# Patient Record
Sex: FEMALE | Race: BLACK OR AFRICAN AMERICAN | NOT HISPANIC OR LATINO | Employment: UNEMPLOYED | URBAN - METROPOLITAN AREA
[De-identification: names, ages, dates, MRNs, and addresses within clinical notes are randomized per-mention and may not be internally consistent; named-entity substitution may affect disease eponyms.]

---

## 2021-08-13 ENCOUNTER — TRANSFERRED RECORDS (OUTPATIENT)
Dept: HEALTH INFORMATION MANAGEMENT | Facility: CLINIC | Age: 56
End: 2021-08-13

## 2022-02-04 PROCEDURE — 99285 EMERGENCY DEPT VISIT HI MDM: CPT | Performed by: EMERGENCY MEDICINE

## 2022-02-04 PROCEDURE — C9803 HOPD COVID-19 SPEC COLLECT: HCPCS | Performed by: EMERGENCY MEDICINE

## 2022-02-04 PROCEDURE — 99285 EMERGENCY DEPT VISIT HI MDM: CPT | Mod: 25 | Performed by: EMERGENCY MEDICINE

## 2022-02-05 ENCOUNTER — APPOINTMENT (OUTPATIENT)
Dept: CT IMAGING | Facility: CLINIC | Age: 57
DRG: 375 | End: 2022-02-05
Attending: EMERGENCY MEDICINE
Payer: COMMERCIAL

## 2022-02-05 ENCOUNTER — APPOINTMENT (OUTPATIENT)
Dept: GENERAL RADIOLOGY | Facility: CLINIC | Age: 57
DRG: 375 | End: 2022-02-05
Attending: OBSTETRICS & GYNECOLOGY
Payer: COMMERCIAL

## 2022-02-05 ENCOUNTER — HOSPITAL ENCOUNTER (INPATIENT)
Facility: CLINIC | Age: 57
LOS: 6 days | Discharge: HOME | DRG: 375 | End: 2022-02-11
Attending: EMERGENCY MEDICINE | Admitting: OBSTETRICS & GYNECOLOGY
Payer: COMMERCIAL

## 2022-02-05 DIAGNOSIS — Z85.42 PERSONAL HISTORY OF MALIGNANT NEOPLASM OF UTERUS: ICD-10-CM

## 2022-02-05 DIAGNOSIS — Z90.710 S/P HYSTERECTOMY: ICD-10-CM

## 2022-02-05 DIAGNOSIS — R11.2 NAUSEA AND VOMITING, INTRACTABILITY OF VOMITING NOT SPECIFIED, UNSPECIFIED VOMITING TYPE: ICD-10-CM

## 2022-02-05 DIAGNOSIS — K56.609 SMALL BOWEL OBSTRUCTION (H): ICD-10-CM

## 2022-02-05 DIAGNOSIS — C78.6 METASTASIS TO PERITONEAL CAVITY (H): ICD-10-CM

## 2022-02-05 DIAGNOSIS — R10.84 ABDOMINAL PAIN, GENERALIZED: ICD-10-CM

## 2022-02-05 DIAGNOSIS — Z20.822 CONTACT WITH AND (SUSPECTED) EXPOSURE TO COVID-19: ICD-10-CM

## 2022-02-05 DIAGNOSIS — C55 MALIGNANT MIXED MULLERIAN TUMOR (MMMT) OF UTERUS (H): ICD-10-CM

## 2022-02-05 LAB
ALBUMIN SERPL-MCNC: 3.7 G/DL (ref 3.4–5)
ALBUMIN UR-MCNC: 30 MG/DL
ALP SERPL-CCNC: 68 U/L (ref 40–150)
ALT SERPL W P-5'-P-CCNC: 38 U/L (ref 0–50)
ANION GAP SERPL CALCULATED.3IONS-SCNC: 10 MMOL/L (ref 3–14)
ANION GAP SERPL CALCULATED.3IONS-SCNC: 9 MMOL/L (ref 3–14)
APPEARANCE UR: CLEAR
AST SERPL W P-5'-P-CCNC: 100 U/L (ref 0–45)
BASOPHILS # BLD AUTO: 0 10E3/UL (ref 0–0.2)
BASOPHILS NFR BLD AUTO: 0 %
BILIRUB SERPL-MCNC: 0.3 MG/DL (ref 0.2–1.3)
BILIRUB UR QL STRIP: NEGATIVE
BUN SERPL-MCNC: 10 MG/DL (ref 7–30)
BUN SERPL-MCNC: 15 MG/DL (ref 7–30)
CALCIUM SERPL-MCNC: 9 MG/DL (ref 8.5–10.1)
CALCIUM SERPL-MCNC: 9.4 MG/DL (ref 8.5–10.1)
CHLORIDE BLD-SCNC: 101 MMOL/L (ref 94–109)
CHLORIDE BLD-SCNC: 99 MMOL/L (ref 94–109)
CO2 SERPL-SCNC: 28 MMOL/L (ref 20–32)
CO2 SERPL-SCNC: 29 MMOL/L (ref 20–32)
COLOR UR AUTO: ABNORMAL
CREAT SERPL-MCNC: 0.53 MG/DL (ref 0.52–1.04)
CREAT SERPL-MCNC: 0.53 MG/DL (ref 0.52–1.04)
CREAT SERPL-MCNC: 0.73 MG/DL (ref 0.52–1.04)
EOSINOPHIL # BLD AUTO: 0 10E3/UL (ref 0–0.7)
EOSINOPHIL NFR BLD AUTO: 0 %
ERYTHROCYTE [DISTWIDTH] IN BLOOD BY AUTOMATED COUNT: 25.7 % (ref 10–15)
GFR SERPL CREATININE-BSD FRML MDRD: >90 ML/MIN/1.73M2
GLUCOSE BLD-MCNC: 113 MG/DL (ref 70–99)
GLUCOSE BLD-MCNC: 85 MG/DL (ref 70–99)
GLUCOSE UR STRIP-MCNC: NEGATIVE MG/DL
HCT VFR BLD AUTO: 30 % (ref 35–47)
HGB BLD-MCNC: 9.9 G/DL (ref 11.7–15.7)
HGB UR QL STRIP: NEGATIVE
HOLD SPECIMEN: NORMAL
IMM GRANULOCYTES # BLD: 0 10E3/UL
IMM GRANULOCYTES NFR BLD: 0 %
KETONES UR STRIP-MCNC: 20 MG/DL
LEUKOCYTE ESTERASE UR QL STRIP: NEGATIVE
LIPASE SERPL-CCNC: 50 U/L (ref 73–393)
LYMPHOCYTES # BLD AUTO: 0.4 10E3/UL (ref 0.8–5.3)
LYMPHOCYTES NFR BLD AUTO: 8 %
MCH RBC QN AUTO: 23.6 PG (ref 26.5–33)
MCHC RBC AUTO-ENTMCNC: 33 G/DL (ref 31.5–36.5)
MCV RBC AUTO: 72 FL (ref 78–100)
MONOCYTES # BLD AUTO: 0.3 10E3/UL (ref 0–1.3)
MONOCYTES NFR BLD AUTO: 6 %
NEUTROPHILS # BLD AUTO: 3.8 10E3/UL (ref 1.6–8.3)
NEUTROPHILS NFR BLD AUTO: 86 %
NITRATE UR QL: NEGATIVE
NRBC # BLD AUTO: 0 10E3/UL
NRBC BLD AUTO-RTO: 0 /100
PH UR STRIP: 6.5 [PH] (ref 5–7)
PHOSPHATE SERPL-MCNC: 4.4 MG/DL (ref 2.5–4.5)
PLATELET # BLD AUTO: 198 10E3/UL (ref 150–450)
POTASSIUM BLD-SCNC: 3.2 MMOL/L (ref 3.4–5.3)
POTASSIUM BLD-SCNC: 3.2 MMOL/L (ref 3.4–5.3)
POTASSIUM BLD-SCNC: 3.3 MMOL/L (ref 3.4–5.3)
POTASSIUM BLD-SCNC: 3.3 MMOL/L (ref 3.4–5.3)
PROT SERPL-MCNC: 8.5 G/DL (ref 6.8–8.8)
RBC # BLD AUTO: 4.19 10E6/UL (ref 3.8–5.2)
RBC URINE: 2 /HPF
SARS-COV-2 RNA RESP QL NAA+PROBE: NEGATIVE
SODIUM SERPL-SCNC: 137 MMOL/L (ref 133–144)
SODIUM SERPL-SCNC: 139 MMOL/L (ref 133–144)
SP GR UR STRIP: 1.02 (ref 1–1.03)
SQUAMOUS EPITHELIAL: 2 /HPF
TRANSITIONAL EPI: <1 /HPF
UROBILINOGEN UR STRIP-MCNC: NORMAL MG/DL
WBC # BLD AUTO: 4.4 10E3/UL (ref 4–11)
WBC URINE: 2 /HPF

## 2022-02-05 PROCEDURE — 999N000065 XR ABDOMEN PORT 1 VIEWS

## 2022-02-05 PROCEDURE — 74177 CT ABD & PELVIS W/CONTRAST: CPT

## 2022-02-05 PROCEDURE — 250N000013 HC RX MED GY IP 250 OP 250 PS 637: Performed by: OBSTETRICS & GYNECOLOGY

## 2022-02-05 PROCEDURE — 96365 THER/PROPH/DIAG IV INF INIT: CPT | Mod: 59 | Performed by: EMERGENCY MEDICINE

## 2022-02-05 PROCEDURE — 36415 COLL VENOUS BLD VENIPUNCTURE: CPT | Performed by: OBSTETRICS & GYNECOLOGY

## 2022-02-05 PROCEDURE — 83690 ASSAY OF LIPASE: CPT | Performed by: EMERGENCY MEDICINE

## 2022-02-05 PROCEDURE — 84100 ASSAY OF PHOSPHORUS: CPT | Performed by: STUDENT IN AN ORGANIZED HEALTH CARE EDUCATION/TRAINING PROGRAM

## 2022-02-05 PROCEDURE — 250N000011 HC RX IP 250 OP 636: Performed by: OBSTETRICS & GYNECOLOGY

## 2022-02-05 PROCEDURE — 74018 RADEX ABDOMEN 1 VIEW: CPT | Mod: 26 | Performed by: RADIOLOGY

## 2022-02-05 PROCEDURE — 250N000009 HC RX 250: Performed by: STUDENT IN AN ORGANIZED HEALTH CARE EDUCATION/TRAINING PROGRAM

## 2022-02-05 PROCEDURE — 80053 COMPREHEN METABOLIC PANEL: CPT | Performed by: EMERGENCY MEDICINE

## 2022-02-05 PROCEDURE — 250N000011 HC RX IP 250 OP 636: Performed by: EMERGENCY MEDICINE

## 2022-02-05 PROCEDURE — 258N000003 HC RX IP 258 OP 636: Performed by: STUDENT IN AN ORGANIZED HEALTH CARE EDUCATION/TRAINING PROGRAM

## 2022-02-05 PROCEDURE — 36415 COLL VENOUS BLD VENIPUNCTURE: CPT | Performed by: EMERGENCY MEDICINE

## 2022-02-05 PROCEDURE — 84132 ASSAY OF SERUM POTASSIUM: CPT | Performed by: OBSTETRICS & GYNECOLOGY

## 2022-02-05 PROCEDURE — 74177 CT ABD & PELVIS W/CONTRAST: CPT | Mod: 26 | Performed by: RADIOLOGY

## 2022-02-05 PROCEDURE — 250N000011 HC RX IP 250 OP 636: Performed by: STUDENT IN AN ORGANIZED HEALTH CARE EDUCATION/TRAINING PROGRAM

## 2022-02-05 PROCEDURE — U0005 INFEC AGEN DETEC AMPLI PROBE: HCPCS | Performed by: EMERGENCY MEDICINE

## 2022-02-05 PROCEDURE — 82565 ASSAY OF CREATININE: CPT | Performed by: STUDENT IN AN ORGANIZED HEALTH CARE EDUCATION/TRAINING PROGRAM

## 2022-02-05 PROCEDURE — 85025 COMPLETE CBC W/AUTO DIFF WBC: CPT | Performed by: EMERGENCY MEDICINE

## 2022-02-05 PROCEDURE — 250N000009 HC RX 250: Performed by: OBSTETRICS & GYNECOLOGY

## 2022-02-05 PROCEDURE — 81003 URINALYSIS AUTO W/O SCOPE: CPT | Performed by: STUDENT IN AN ORGANIZED HEALTH CARE EDUCATION/TRAINING PROGRAM

## 2022-02-05 PROCEDURE — 96375 TX/PRO/DX INJ NEW DRUG ADDON: CPT | Performed by: EMERGENCY MEDICINE

## 2022-02-05 PROCEDURE — 36415 COLL VENOUS BLD VENIPUNCTURE: CPT | Performed by: STUDENT IN AN ORGANIZED HEALTH CARE EDUCATION/TRAINING PROGRAM

## 2022-02-05 PROCEDURE — 96366 THER/PROPH/DIAG IV INF ADDON: CPT | Performed by: EMERGENCY MEDICINE

## 2022-02-05 PROCEDURE — 120N000002 HC R&B MED SURG/OB UMMC

## 2022-02-05 PROCEDURE — 250N000013 HC RX MED GY IP 250 OP 250 PS 637: Performed by: STUDENT IN AN ORGANIZED HEALTH CARE EDUCATION/TRAINING PROGRAM

## 2022-02-05 RX ORDER — NALOXONE HYDROCHLORIDE 0.4 MG/ML
0.2 INJECTION, SOLUTION INTRAMUSCULAR; INTRAVENOUS; SUBCUTANEOUS
Status: DISCONTINUED | OUTPATIENT
Start: 2022-02-05 | End: 2022-02-11 | Stop reason: HOSPADM

## 2022-02-05 RX ORDER — SODIUM CHLORIDE 9 MG/ML
INJECTION, SOLUTION INTRAVENOUS CONTINUOUS
Status: DISCONTINUED | OUTPATIENT
Start: 2022-02-05 | End: 2022-02-06

## 2022-02-05 RX ORDER — PROCHLORPERAZINE MALEATE 5 MG
5 TABLET ORAL EVERY 6 HOURS PRN
Status: DISCONTINUED | OUTPATIENT
Start: 2022-02-05 | End: 2022-02-11 | Stop reason: HOSPADM

## 2022-02-05 RX ORDER — IBUPROFEN 600 MG/1
600 TABLET, FILM COATED ORAL EVERY 6 HOURS PRN
COMMUNITY
Start: 2021-09-21

## 2022-02-05 RX ORDER — POTASSIUM CHLORIDE 1.5 G/1.58G
20 POWDER, FOR SOLUTION ORAL ONCE
Status: DISCONTINUED | OUTPATIENT
Start: 2022-02-05 | End: 2022-02-05

## 2022-02-05 RX ORDER — DIPHENHYDRAMINE HYDROCHLORIDE 50 MG/ML
50 INJECTION INTRAMUSCULAR; INTRAVENOUS
Status: DISCONTINUED | OUTPATIENT
Start: 2022-02-05 | End: 2022-02-11 | Stop reason: HOSPADM

## 2022-02-05 RX ORDER — LORAZEPAM 2 MG/ML
.5-1 INJECTION INTRAMUSCULAR EVERY 6 HOURS PRN
Status: DISCONTINUED | OUTPATIENT
Start: 2022-02-05 | End: 2022-02-11 | Stop reason: HOSPADM

## 2022-02-05 RX ORDER — ONDANSETRON 4 MG/1
4 TABLET, ORALLY DISINTEGRATING ORAL ONCE
Status: COMPLETED | OUTPATIENT
Start: 2022-02-05 | End: 2022-02-05

## 2022-02-05 RX ORDER — ONDANSETRON 8 MG/1
8 TABLET, FILM COATED ORAL 3 TIMES DAILY PRN
COMMUNITY
Start: 2021-09-27

## 2022-02-05 RX ORDER — ACETAMINOPHEN 325 MG/1
650 TABLET ORAL EVERY 6 HOURS PRN
COMMUNITY
Start: 2021-09-21

## 2022-02-05 RX ORDER — PROCHLORPERAZINE MALEATE 5 MG
5 TABLET ORAL EVERY 6 HOURS PRN
Status: DISCONTINUED | OUTPATIENT
Start: 2022-02-05 | End: 2022-02-05

## 2022-02-05 RX ORDER — POTASSIUM CHLORIDE 7.45 MG/ML
10 INJECTION INTRAVENOUS
Status: COMPLETED | OUTPATIENT
Start: 2022-02-05 | End: 2022-02-05

## 2022-02-05 RX ORDER — FOLIC ACID 1 MG/1
1000 TABLET ORAL DAILY
COMMUNITY
Start: 2021-12-03

## 2022-02-05 RX ORDER — AMLODIPINE BESYLATE 10 MG/1
10 TABLET ORAL ONCE
Status: COMPLETED | OUTPATIENT
Start: 2022-02-05 | End: 2022-02-05

## 2022-02-05 RX ORDER — AMLODIPINE BESYLATE 10 MG/1
10 TABLET ORAL DAILY
Status: DISCONTINUED | OUTPATIENT
Start: 2022-02-05 | End: 2022-02-05

## 2022-02-05 RX ORDER — AMOXICILLIN 250 MG
1 CAPSULE ORAL 2 TIMES DAILY PRN
COMMUNITY
Start: 2021-10-18

## 2022-02-05 RX ORDER — POTASSIUM CHLORIDE 1.5 G/1.58G
20 POWDER, FOR SOLUTION ORAL ONCE
Status: COMPLETED | OUTPATIENT
Start: 2022-02-05 | End: 2022-02-05

## 2022-02-05 RX ORDER — IOPAMIDOL 755 MG/ML
66 INJECTION, SOLUTION INTRAVASCULAR ONCE
Status: COMPLETED | OUTPATIENT
Start: 2022-02-05 | End: 2022-02-05

## 2022-02-05 RX ORDER — LORATADINE 10 MG/1
10 TABLET ORAL 2 TIMES DAILY PRN
COMMUNITY
Start: 2021-12-13

## 2022-02-05 RX ORDER — NALOXONE HYDROCHLORIDE 0.4 MG/ML
0.4 INJECTION, SOLUTION INTRAMUSCULAR; INTRAVENOUS; SUBCUTANEOUS
Status: DISCONTINUED | OUTPATIENT
Start: 2022-02-05 | End: 2022-02-11 | Stop reason: HOSPADM

## 2022-02-05 RX ORDER — LORAZEPAM 0.5 MG/1
0.5 TABLET ORAL EVERY 6 HOURS PRN
COMMUNITY
Start: 2021-09-27

## 2022-02-05 RX ORDER — ONDANSETRON 2 MG/ML
8 INJECTION INTRAMUSCULAR; INTRAVENOUS ONCE
Status: COMPLETED | OUTPATIENT
Start: 2022-02-05 | End: 2022-02-05

## 2022-02-05 RX ORDER — POTASSIUM CHLORIDE 7.45 MG/ML
10 INJECTION INTRAVENOUS
Status: COMPLETED | OUTPATIENT
Start: 2022-02-06 | End: 2022-02-06

## 2022-02-05 RX ORDER — AMLODIPINE BESYLATE 10 MG/1
10 TABLET ORAL DAILY
Status: DISCONTINUED | OUTPATIENT
Start: 2022-02-06 | End: 2022-02-11 | Stop reason: HOSPADM

## 2022-02-05 RX ORDER — LORAZEPAM 0.5 MG/1
.5-1 TABLET ORAL EVERY 6 HOURS PRN
Status: DISCONTINUED | OUTPATIENT
Start: 2022-02-05 | End: 2022-02-11 | Stop reason: HOSPADM

## 2022-02-05 RX ORDER — AMLODIPINE BESYLATE 10 MG/1
10 TABLET ORAL DAILY
COMMUNITY
Start: 2021-12-03

## 2022-02-05 RX ORDER — LORAZEPAM 0.5 MG/1
.5-1 TABLET ORAL EVERY 6 HOURS PRN
Status: DISCONTINUED | OUTPATIENT
Start: 2022-02-05 | End: 2022-02-05

## 2022-02-05 RX ORDER — HYDROMORPHONE HCL IN WATER/PF 6 MG/30 ML
.2-.3 PATIENT CONTROLLED ANALGESIA SYRINGE INTRAVENOUS EVERY 6 HOURS PRN
Status: DISCONTINUED | OUTPATIENT
Start: 2022-02-05 | End: 2022-02-11 | Stop reason: HOSPADM

## 2022-02-05 RX ORDER — LORAZEPAM 2 MG/ML
.5-1 INJECTION INTRAMUSCULAR EVERY 6 HOURS PRN
Status: DISCONTINUED | OUTPATIENT
Start: 2022-02-05 | End: 2022-02-05

## 2022-02-05 RX ORDER — ONDANSETRON 2 MG/ML
4 INJECTION INTRAMUSCULAR; INTRAVENOUS EVERY 6 HOURS PRN
Status: DISCONTINUED | OUTPATIENT
Start: 2022-02-05 | End: 2022-02-11 | Stop reason: HOSPADM

## 2022-02-05 RX ORDER — HYDROMORPHONE HCL IN WATER/PF 6 MG/30 ML
0.5 PATIENT CONTROLLED ANALGESIA SYRINGE INTRAVENOUS ONCE
Status: COMPLETED | OUTPATIENT
Start: 2022-02-05 | End: 2022-02-05

## 2022-02-05 RX ADMIN — POTASSIUM CHLORIDE 10 MEQ: 7.46 INJECTION, SOLUTION INTRAVENOUS at 13:38

## 2022-02-05 RX ADMIN — SODIUM CHLORIDE: 9 INJECTION, SOLUTION INTRAVENOUS at 11:39

## 2022-02-05 RX ADMIN — FAMOTIDINE 20 MG: 10 INJECTION, SOLUTION INTRAVENOUS at 19:45

## 2022-02-05 RX ADMIN — ONDANSETRON 4 MG: 4 TABLET, ORALLY DISINTEGRATING ORAL at 00:55

## 2022-02-05 RX ADMIN — HYDROMORPHONE HYDROCHLORIDE 0.5 MG: 0.2 INJECTION, SOLUTION INTRAMUSCULAR; INTRAVENOUS; SUBCUTANEOUS at 04:44

## 2022-02-05 RX ADMIN — POTASSIUM CHLORIDE 10 MEQ: 7.46 INJECTION, SOLUTION INTRAVENOUS at 23:49

## 2022-02-05 RX ADMIN — TOPICAL ANESTHETIC 0.5 ML: 200 SPRAY DENTAL; PERIODONTAL at 10:46

## 2022-02-05 RX ADMIN — ENOXAPARIN SODIUM 40 MG: 40 INJECTION SUBCUTANEOUS at 11:34

## 2022-02-05 RX ADMIN — AMLODIPINE BESYLATE 10 MG: 10 TABLET ORAL at 16:08

## 2022-02-05 RX ADMIN — POTASSIUM CHLORIDE 20 MEQ: 1.5 POWDER, FOR SOLUTION ORAL at 17:32

## 2022-02-05 RX ADMIN — ONDANSETRON 8 MG: 2 INJECTION INTRAMUSCULAR; INTRAVENOUS at 04:44

## 2022-02-05 RX ADMIN — IOPAMIDOL 66 ML: 755 INJECTION, SOLUTION INTRAVENOUS at 04:34

## 2022-02-05 RX ADMIN — POTASSIUM CHLORIDE 10 MEQ: 7.46 INJECTION, SOLUTION INTRAVENOUS at 11:35

## 2022-02-05 RX ADMIN — AMLODIPINE BESYLATE 5 MG: 5 TABLET ORAL at 11:34

## 2022-02-05 ASSESSMENT — ACTIVITIES OF DAILY LIVING (ADL)
ADLS_ACUITY_SCORE: 8
DIFFICULTY_COMMUNICATING: NO
ADLS_ACUITY_SCORE: 8
CONCENTRATING,_REMEMBERING_OR_MAKING_DECISIONS_DIFFICULTY: NO
ADLS_ACUITY_SCORE: 8
DIFFICULTY_EATING/SWALLOWING: NO
WEAR_GLASSES_OR_BLIND: YES
DOING_ERRANDS_INDEPENDENTLY_DIFFICULTY: NO
TOILETING_ISSUES: NO
FALL_HISTORY_WITHIN_LAST_SIX_MONTHS: NO
HEARING_DIFFICULTY_OR_DEAF: NO
ADLS_ACUITY_SCORE: 4
ADLS_ACUITY_SCORE: 8
ADLS_ACUITY_SCORE: 4
DRESSING/BATHING_DIFFICULTY: NO
ADLS_ACUITY_SCORE: 8
ADLS_ACUITY_SCORE: 8
VISION_MANAGEMENT: GLASSES
ADLS_ACUITY_SCORE: 8
ADLS_ACUITY_SCORE: 4
WALKING_OR_CLIMBING_STAIRS_DIFFICULTY: NO
ADLS_ACUITY_SCORE: 4
ADLS_ACUITY_SCORE: 4

## 2022-02-05 ASSESSMENT — MIFFLIN-ST. JEOR: SCORE: 1192.82

## 2022-02-05 NOTE — H&P
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     Noted onset of symptoms approximately one week ago with gradually worsening generalized abdominal pain. Baseline constipation worsened with time. Last BM one week ago, and was passing flatus until 3-4 days ago. Became increasingly nauseated today with several bouts of emesis yesterday. Thus prompted her emergent visit.     Today she has yet to vomit but continues to feel nauseated and have abdominal pain. No flatus or BMs as noted above. Denies fevers, chills, chest pain, shortness of breath. No vaginal bleeding of late. All other ROS negative.     Additionally, the patient notes worsening swelling to her right breast. Plan was to evaluate as an outpatient with breast ultrasound imaging and mammography, but not able to be done in the interim. Does note some swollen glands just adjacent to her breast, but denies lumps to the breast tissue itself. Denies breast tenderness, skin changes, or nipple discharge.          Cancer Treatment History:   4/28/21-5/2/21: Admitted to [facility] with heavy uterine bleeding and severe anemia.   -Gynecology consultation: On limited pelvic exam likely necrotic, friable tissue from possible vaginal side wall/cervical mass versus prolapsing uterine fibroid.  -Pathology: Mullerian carcinosarcoma; Comment: The tumor exhibits extensive necrosis.  Only focal epithelial elements are present in  this sample.  The majority of viable appearing tumor resembles a pleomorphic, high-grade sarcoma; IHC: CK Enrico+, desmin+, WT1+, CD10+, keratin AE1/AE3+; pan-melanoma-, SMA-, ER-.   -4/29/21: Pelvic ultrasound:   UTERUS: 23.3 x 1.5 x 17.8 cm. Enlarged myomatous uterus. The largest include: An intramural fibroid fundus measures 10.4 cm. A transmural fibroid at the right uterine body measures 9.5 cm. A transmural fibroid at the right lower uterine body measures 8.2 cm. There is a large echogenic structure within the cervix and proximal vagina measuring 7.5 x 4.6 x 6.5 cm.  ENDOMETRIUM: Obscured by fibroids.  RIGHT OVARY: Not visualized due to overlying bowel gas.   LEFT OVARY: 5.6 x 3.9 x 3.9 cm. Normal arterial and venous duplex flow identified. There is a simple 5.4 cm left ovarian cyst.  No significant free fluid.   -5/1/21: Staging chest, abdomen, pelvic CT: I have personally reviewed the CT images.   LUNGS AND PLEURA: 6.4 x 4.9 mm solid peripheral right lower lobe pulmonary nodule.  HEPATOBILIARY: 11 x 10 mm segment 4 fluid attenuation hepatic hypodensity and 11 x 9 mm segment 6 fluid attenuation hepatic hypodensity with an additional 4 mm segment 6 hypodensity, findings are favored to represent cysts, however evaluation with contrast-enhanced MRI in the setting of known mullerian carcinosarcoma.  PELVIC ORGANS: Markedly thickened 4.5 cm endometrium with enhancing soft tissue consistent with endometrial malignancy. Enlarged fibroid uterus.  No significant free fluid.  -Gynecologic oncology consultation with Dr. Burger. Patient declined gyn onc exam, and declined urgent surgical management. Plan for follow-up as an outpatient after optimization of health status and additional imaging. No follow-up due to dizziness and concern that she would lose consciousness on the bus.      8/13/21: Presented to [facility] ED with heavy vaginal bleeding, dyspnea and severe anemia with hemoglobin 4.8.   -Chest CTA: PAIGE.   Pelvic  exam under anesthesia, exploratory laparotomy, optimal tumor debulking to no gross residual disease, including modified radical hysterectomy, bilateral salpingo-oophorectomy, infra gastric omentectomy, right pelvic lymph node resection, stripping of bladder and the left pelvic peritoneum, removal of additional peritoneal and transverse colon epiploic nodules, removal of pelvic fluid, cystoscopy.  PATH:  A. Omentum, excision:    Positive for carcinosarcoma     B. Ovary, left ovary and fallopian tube, salpingo-oophorectomy:    Carcinosarcoma involving left fallopian tube and ovary     C. Lymph node, right pelvic lymph node, excision:    Carcinosarcoma, size 1.2 cm    Lymphoid tissue is not identified    See comment     D. Uterus, cervix, right ovary and right fallopian tube, hysterectomy and right salpingo-oophorectomy:    Malignant mixed Mullerian tumor/carcinosarcoma, size 21.5 cm, see comment    Carcinosarcoma involves full thickness myometrium, serosa and cervix    Margins: Anterior paracervical margin is positive for carcinosarcoma    Lymphovascular invasion: Present    Leiomyomata, largest 8.1 cm in greatest dimension, involved by carcinosarcoma     Benign right ovary and fallopian tube, uninvolved by carcinosarcoma    Carcinosarcoma shows a normal pattern of mismatch repair protein, see comment    See Synoptic Report below     E. Bladder, bladder peritoneum, biopsy:    Positive for carcinosarcoma     F. Peritoneum, peritoneal nodules, biopsy:    Positive for carcinosarcoma     G. Peritoneum, epiploica nodules, biopsy:    Positive for carcinosarcoma      H. Peritoneum, periumbilical peritoneal nodule, biopsy:    Positive for carcinosarcoma     9/10/21: Seen in clinic with Dr. Burger. Plan for Carboplatin AUC 6 and Taxol 175mg/m2 IV every three weeks x six cycles.    9/24/21: CT C/A/P:   IMPRESSION:  1.  Extensive metastatic disease is present with numerous peritoneal and mesenteric implants throughout the  abdomen and pelvis. Confluent aortocaval and iliac chain adenopathy, in the anterior abdominal wall, right axilla, and right paratracheal mediastinum. Mild abdominal ascites.  2.  Three new 5 mm right lung nodules in the right apex are indeterminant.  3.  New bilateral hydronephrosis secondary to extrinsic compression of the distal ureters secondary to #1.      9/27/2021-1/14/22: s/p C1-5 Carbo/Taxol. C6 held due to new breast findings as noted in HPI. Plan for urgent breast imaging - not yet performed.          Past Medical History:     Past Medical History:   Diagnosis Date     Cancer (H)     uterine            Past Surgical History:    No past surgical history on file.         Social History:     Social History     Tobacco Use     Smoking status: Not on file     Smokeless tobacco: Not on file   Substance Use Topics     Alcohol use: Not on file            Family History:   No family history on file.         Allergies:   No Known Allergies         Medications:     Current Facility-Administered Medications   Medication     amLODIPine (NORVASC) tablet 10 mg     amLODIPine (NORVASC) tablet 10 mg     diphenhydrAMINE (BENADRYL) injection 50 mg     enoxaparin ANTICOAGULANT (LOVENOX) injection 40 mg     LORazepam (ATIVAN) tablet 0.5-1 mg    Or     LORazepam (ATIVAN) injection 0.5-1 mg     ondansetron (ZOFRAN) injection 4 mg     prochlorperazine (COMPAZINE) tablet 5 mg    Or     prochlorperazine (COMPAZINE) injection 5 mg     sodium chloride 0.9% infusion            Review of Systems:   All other ROS negative except that noted in HPI.          Physical Exam:     Vitals:    02/05/22 1352 02/05/22 1359 02/05/22 1400 02/05/22 1437   BP:   (!) 164/85 (!) 157/80   BP Location:    Left arm   Pulse:   97 96   Resp:    16   Temp:    99.2  F (37.3  C)   TempSrc:    Oral   SpO2: 98% 98% 97% 96%   Weight:       Height:         General: NAD  CV: RRR, no m/g/r  Resp: CTAB  Abdomen: soft, pan-tender, moderately distended without rebound  or guarding.   Breast: R axillary LN without adenopathy.   Extremities: WWP, no edema         Data:     Results for orders placed or performed during the hospital encounter of 02/05/22 (from the past 24 hour(s))   CBC with platelets differential    Narrative    The following orders were created for panel order CBC with platelets differential.  Procedure                               Abnormality         Status                     ---------                               -----------         ------                     CBC with platelets and d...[[id number]]  Abnormal            Final result                 Please view results for these tests on the individual orders.   Comprehensive metabolic panel   Result Value Ref Range    Sodium 137 133 - 144 mmol/L    Potassium 3.2 (L) 3.4 - 5.3 mmol/L    Chloride 99 94 - 109 mmol/L    Carbon Dioxide (CO2) 28 20 - 32 mmol/L    Anion Gap 10 3 - 14 mmol/L    Urea Nitrogen 10 7 - 30 mg/dL    Creatinine 0.53 0.52 - 1.04 mg/dL    Calcium 9.4 8.5 - 10.1 mg/dL    Glucose 113 (H) 70 - 99 mg/dL    Alkaline Phosphatase 68 40 - 150 U/L     (H) 0 - 45 U/L    ALT 38 0 - 50 U/L    Protein Total 8.5 6.8 - 8.8 g/dL    Albumin 3.7 3.4 - 5.0 g/dL    Bilirubin Total 0.3 0.2 - 1.3 mg/dL    GFR Estimate >90 >60 mL/min/1.73m2   Lipase   Result Value Ref Range    Lipase 50 (L) 73 - 393 U/L   [place] Draw    Narrative    The following orders were created for panel order [place] Draw.  Procedure                               Abnormality         Status                     ---------                               -----------         ------                     Extra Red Top Tube[[id number]]                               Final result               Extra Blood Bank Purple ...[[id number]]                      Final result                 Please view results for these tests on the individual orders.   CBC with platelets and differential   Result Value Ref Range    WBC Count 4.4 4.0 - 11.0 10e3/uL    RBC Count  4.19 3.80 - 5.20 10e6/uL    Hemoglobin 9.9 (L) 11.7 - 15.7 g/dL    Hematocrit 30.0 (L) 35.0 - 47.0 %    MCV 72 (L) 78 - 100 fL    MCH 23.6 (L) 26.5 - 33.0 pg    MCHC 33.0 31.5 - 36.5 g/dL    RDW 25.7 (H) 10.0 - 15.0 %    Platelet Count 198 150 - 450 10e3/uL    % Neutrophils 86 %    % Lymphocytes 8 %    % Monocytes 6 %    % Eosinophils 0 %    % Basophils 0 %    % Immature Granulocytes 0 %    NRBCs per 100 WBC 0 <1 /100    Absolute Neutrophils 3.8 1.6 - 8.3 10e3/uL    Absolute Lymphocytes 0.4 (L) 0.8 - 5.3 10e3/uL    Absolute Monocytes 0.3 0.0 - 1.3 10e3/uL    Absolute Eosinophils 0.0 0.0 - 0.7 10e3/uL    Absolute Basophils 0.0 0.0 - 0.2 10e3/uL    Absolute Immature Granulocytes 0.0 <=0.4 10e3/uL    Absolute NRBCs 0.0 10e3/uL   Extra Red Top Tube   Result Value Ref Range    Hold Specimen JIC    Extra Tube ([place] Draw)    Narrative    The following orders were created for panel order Extra Tube ([place] Draw).  Procedure                               Abnormality         Status                     ---------                               -----------         ------                     Extra Blue Top Tube[[id number]]                              Final result                 Please view results for these tests on the individual orders.   Extra Blue Top Tube   Result Value Ref Range    Hold Specimen JIC    Creatinine   Result Value Ref Range    Creatinine 0.53 0.52 - 1.04 mg/dL    GFR Estimate >90 >60 mL/min/1.73m2   Extra Blood Bank Purple Top Tube   Result Value Ref Range    Hold Specimen JIC    POC US ABDOMEN LIMITED    Impression    Cancel study.   CT Abdomen Pelvis w Contrast    Narrative    EXAM: CT ABDOMEN AND PELVIS WITH CONTRAST  LOCATION: [facility]  DATE/TIME: 02/05/2022, 4:10 AM    INDICATION: Right-sided abdominal pain, vomiting, history of uterine cancer, status post hysterectomy.  COMPARISON: None.  TECHNIQUE: CT scan of the abdomen and pelvis was performed  following injection of IV contrast. Multiplanar reformats were obtained. Dose reduction techniques were used.  CONTRAST: Iopamidol (Isovue 370) solution 66 mL.    FINDINGS:   LOWER CHEST: Small left pleural effusion. Dependent atelectasis at the lung bases. Skin thickening over the right breast. Enlarged lymph node in the right cardiophrenic angle measuring 2.7 x 1.3 cm.    HEPATOBILIARY: There are two small cysts within the liver. A few soft tissue tissue masses abutting the left lobe of liver likely peritoneal metastases. No calcified gallstone.    PANCREAS: Normal.    SPLEEN: Normal.    ADRENAL GLANDS: Normal.    KIDNEYS/BLADDER: Normal.    BOWEL: There are several dilated small bowel loops in the left abdomen. Distal small bowel and colon are decompressed. There is some rotation about the root of the mesentery involving mid small bowel loops. This does not appear to be a cause of   obstruction. There are multiple soft tissue masses throughout the abdomen consistent with metastatic disease. Large mass in the abdomen anteriorly abutting the anterior abdominal wall measures approximately 14.5 x 8.6 cm. There are multiple additional   abdominal masses.    LYMPH NODES: Multiple enlarged retroperitoneal and pelvic lymph nodes. A large right retroperitoneal mass encasing the inferior vena cava measures 8.9 x 5.9 cm.    VASCULATURE: Unremarkable.    PELVIC ORGANS: Large mass in the deep pelvis between the rectum and bladder measuring 7.7 x 8.1 cm.    MUSCULOSKELETAL: There are several solid masses in the subcutaneous fat of the anterior abdominal wall consistent with metastases.      Impression    IMPRESSION:   1.  Extensive peritoneal metastatic disease. Extensive retroperitoneal and pelvic lymph node enlargement.  2.  Mid small bowel obstruction.  3.  Small left pleural effusion.  4.  Skin thickening over the right breast.     Asymptomatic COVID-19 Virus (Coronavirus) by PCR Nasopharyngeal    Specimen: Nasopharyngeal;  Swab   Result Value Ref Range    SARS CoV2 PCR Negative Negative, Testing sent to reference lab. Results will be returned via unsolicited result    Narrative    Testing was performed using the Xpert Xpress SARS-CoV-2 Assay on the  Cepheid Gene-Xpert Instrument Systems. Additional information about  this Emergency Use Authorization (EUA) assay can be found via the Lab  Guide. This test should be ordered for the detection of SARS-CoV-2 in  individuals who meet SARS-CoV-2 clinical and/or epidemiological  criteria. Test performance is unknown in asymptomatic patients. This  test is for in vitro diagnostic use under the FDA EUA for  laboratories certified under CLIA to perform high complexity testing.  This test has not been FDA cleared or approved. A negative result  does not rule out the presence of PCR inhibitors in the specimen or  target RNA in concentration below the limit of detection for the  assay. The possibility of a false negative should be considered if  the patient's recent exposure or clinical presentation suggests  COVID-19. This test was validated by the [facility]. This laboratory is certified under  the Clinical Laboratory Improvement Amendments of 1988 (CLIA-88) as  qualified to perform high complexity laboratory testing.     XR Abdomen Port 1 View    Narrative    EXAMINATION:  XR ABDOMEN PORT 1 VIEWS 2/5/2022 11:09 AM     COMPARISON: CT 2/5/2022.    HISTORY: ng tube placement.    TECHNIQUE: Frontal view of the abdomen.    FINDINGS: Nasogastric tube side-port and tip projecting over the  stomach. No abnormally dilated loops of bowel. No pneumatosis or  portal venous gas. Small left pleural effusion with overlying  compressive atelectasis. Contrast in the bilateral renal collecting  systems likely from CT with contrast earlier same day.      Impression    IMPRESSION:   Nasogastric tube side port and tip projecting over the stomach.    I have personally  reviewed the examination and initial interpretation  and I agree with the findings.    CLARA TOVAR MD         SYSTEM ID:  [id number]   Basic metabolic panel   Result Value Ref Range    Sodium 139 133 - 144 mmol/L    Potassium 3.2 (L) 3.4 - 5.3 mmol/L    Chloride 101 94 - 109 mmol/L    Carbon Dioxide (CO2) 29 20 - 32 mmol/L    Anion Gap 9 3 - 14 mmol/L    Urea Nitrogen 15 7 - 30 mg/dL    Creatinine 0.73 0.52 - 1.04 mg/dL    Calcium 9.0 8.5 - 10.1 mg/dL    Glucose 85 70 - 99 mg/dL    GFR Estimate >90 >60 mL/min/1.73m2   UA reflex to Microscopic and Culture    Specimen: Urine, Clean Catch   Result Value Ref Range    Color Urine Light Yellow Colorless, Straw, Light Yellow, Yellow    Appearance Urine Clear Clear    Glucose Urine Negative Negative mg/dL    Bilirubin Urine Negative Negative    Ketones Urine 20  (A) Negative mg/dL    Specific Gravity Urine 1.025 1.003 - 1.035    Blood Urine Negative Negative    pH Urine 6.5 5.0 - 7.0    Protein Albumin Urine 30  (A) Negative mg/dL    Urobilinogen Urine Normal Normal, 2.0 mg/dL    Nitrite Urine Negative Negative    Leukocyte Esterase Urine Negative Negative    RBC Urine 2 <=2 /HPF    WBC Urine 2 <=5 /HPF    Squamous Epithelials Urine 2 (H) <=1 /HPF    Transitional Epithelials Urine <1 <=1 /HPF    Narrative    Urine Culture not indicated          Assessment and Plan:   Assessment/Plan: Quantkrunal Bealtower is a 56 year old female presenting with malignant SBO.     Dz: Stage IVB uterine carcinosarcoma s/p XL, rad hyst, BSO, debulking to [facility] (8/2021).     Malignant SBO   FEN: Plan NPO, NGT to LIS, NS 100cc/hr. HypoK 3.2 > ERP. AM BMP, Mg, Phos ordered.   Pain: prn IV Dilaudid   Heme: Sickle cell anemia. Hgb 9.9.   CV: HTN - PTA Norvasc 10mg (ord'd) - to assess for additional antihypertensives prn.   Pulm: IS  GI: Malignant SBO as noted on CT A/P (see above Results). Prn IV zofran/compazine, Pepcid BID  : UA nl. NI.   Psych/Neuro/MSK: CT A/P w/ thickened R breast tissue.  To follow up with US of breast.   PPX: SCDs, IS, Lovenox   Drains/Lines: PIV, NGT     Hx Homelessness  - Noted in [facility] chart, per review.   - Consult SW during weekday for resources.     Patient discussed with Gynecology Oncology Fellow Dr. Burt who agrees with the above care plan.     Lisa Brown MD PGY-2  [facility] Gynecology Oncology   Gyn Onc Pager: [phone]  02/05/22 1000AM    Physician Attestation   I, Nidhi Burt MD saw this patient with the resident and agree with the and plan of care as documented in the note.  I personally reviewed vital signs, medications, and labs. Comments:     Agree with note above, plan for NPO, NGT for SBO. Per chart review patient was supposed to have chemotherapy Cycle 6 Friday however cancelled due to breast swelling. S/p exam by team then with plan for breast US and mammogram. Has known axillary lymphadenopathy. Plan for US while inpatient.     Nidhi Burt MD  Gynecology Oncology Fellow         Bertrand Werner MD, MS    Department of Obstetrics and Gynecology   Division of Gynecologic Oncology   [facility]  Phone: [phone]

## 2022-02-05 NOTE — PLAN OF CARE
Admitted/transferred from: ED  2 RN full   skin assessment completed by Radha Boyer, RN and Aldo WHITE RN.  Skin assessment finding: skin intact, no problems   Interventions/actions: other : none     Will continue to monitor.

## 2022-02-05 NOTE — ED PROVIDER NOTES
History     Chief Complaint   Patient presents with     Abdominal Pain     Nausea & Vomiting     HPI  Liam Jacobsen is a 56 year old female with PMH notable for uterine cancer status post hysterectomy who presents to the ED with abdominal pain and vomiting.  Patient reports that she has been getting chemotherapy once every 3 weeks since this past September.  She was going to have a course start this past day, but she was having increased breast pain and swelling of some lymph nodes in her axilla.  She thus did not have the chemotherapy, last had it 3 weeks ago.  After getting home in the early afternoon she had noticed abdominal pain which is primarily right-sided.  It is aching in quality.  Patient with associated vomiting many times through the afternoon.  She notes constipation, not having diarrhea.  No urinary symptoms.     Past Medical History  Past Medical History:   Diagnosis Date     Cancer (H)     uterine     No past surgical history on file.  acetaminophen (TYLENOL) 325 MG tablet  amLODIPine (NORVASC) 10 MG tablet  CHOLECALCIFEROL PO  folic acid (FOLVITE) 1 MG tablet  ibuprofen (ADVIL/MOTRIN) 600 MG tablet  loratadine (CLARITIN) 10 MG tablet  LORazepam (ATIVAN) 0.5 MG tablet  Multiple Vitamins-Minerals (CENTRUM MULTIGUMMIES) CHEW  ondansetron (ZOFRAN) 8 MG tablet  senna-docusate (SENOKOT-S/PERICOLACE) 8.6-50 MG tablet      No Known Allergies  Social History   Social History     Tobacco Use     Smoking status: Not on file     Smokeless tobacco: Not on file   Substance Use Topics     Alcohol use: Not on file     Drug use: Not on file      Past medical history and social history were reviewed with the patient. Additional pertinent items: Chemotherapy once every 3 weeks    Review of Systems  A complete review of systems was performed with pertinent positives and negatives noted in the HPI, and all other systems negative.    Physical Exam   BP: (!) 160/90  Pulse: 106  Temp: 99.7  F (37.6  C)  Resp:  "16  Height: 157.5 cm (5' 2\")  Weight: 64 kg (141 lb)  SpO2: 99 %    Physical Exam  General: no acute distress. Appears stated age.   HENT: dry MM, no oropharyngeal lesions  Eyes: PERRL, normal sclerae  Cardio: mildly tachycardic rate. Regular rhythm. Extremities well perfused  Resp: Normal work of breathing, normal respiratory rate.  Chest/Back: no visual signs of trauma, right breast without significant tenderness, no erythema  Abdomen: diffuse tenderness greater in RUQ and RLQ, non-distended, no rebound, no guarding  Neuro: alert and fully oriented. CN II-XII grossly intact. Grossly normal strength and sensation in all extremities.   MSK: no deformities. Grossly normal ROM in extremities.   Integumentary/Skin: no rash visualized, normal color  Psych: normal affect, normal behavior    ED Course      Procedures  Results for orders placed during the hospital encounter of 02/05/22    POC US ABDOMEN LIMITED    Impression  Cancel study.          Labs Ordered and Resulted from Time of ED Arrival to Time of ED Departure   COMPREHENSIVE METABOLIC PANEL - Abnormal       Result Value    Sodium 137      Potassium 3.2 (*)     Chloride 99      Carbon Dioxide (CO2) 28      Anion Gap 10      Urea Nitrogen 10      Creatinine 0.53      Calcium 9.4      Glucose 113 (*)     Alkaline Phosphatase 68       (*)     ALT 38      Protein Total 8.5      Albumin 3.7      Bilirubin Total 0.3      GFR Estimate >90     LIPASE - Abnormal    Lipase 50 (*)    CBC WITH PLATELETS AND DIFFERENTIAL - Abnormal    WBC Count 4.4      RBC Count 4.19      Hemoglobin 9.9 (*)     Hematocrit 30.0 (*)     MCV 72 (*)     MCH 23.6 (*)     MCHC 33.0      RDW 25.7 (*)     Platelet Count 198      % Neutrophils 86      % Lymphocytes 8      % Monocytes 6      % Eosinophils 0      % Basophils 0      % Immature Granulocytes 0      NRBCs per 100 WBC 0      Absolute Neutrophils 3.8      Absolute Lymphocytes 0.4 (*)     Absolute Monocytes 0.3      Absolute " Eosinophils 0.0      Absolute Basophils 0.0      Absolute Immature Granulocytes 0.0      Absolute NRBCs 0.0     COVID-19 VIRUS (CORONAVIRUS) BY PCR - Normal    SARS CoV2 PCR Negative       CT Abdomen Pelvis w Contrast   Final Result   IMPRESSION:    1.  Extensive peritoneal metastatic disease. Extensive retroperitoneal and pelvic lymph node enlargement.   2.  Mid small bowel obstruction.   3.  Small left pleural effusion.   4.  Skin thickening over the right breast.         POC US ABDOMEN LIMITED   Final Result   Cancel study.             Assessments & Plan (with Medical Decision Making)   Patient presenting with abdominal pain and vomiting. Vitals in the ED unremarkable. Nursing notes reviewed. Initial differential diagnosis includes but not limited to SBO, appendicitis, cholecystitits, gastroenteritis.     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  Patient's chemotherapy was confirmed to be Taxol/carbo with patient having plan to start cycle #6 yesterday. She is s/p XL, optimal tumor debulking to no gross residual disease, including modified radical hysterectomy, bilateral salpingo-oophorectomy, infra gastric omentectomy, right pelvic lymph node resection, stripping of bladder and the left pelvic peritoneum, removal of additional peritoneal and transverse colon epiploic nodules, removal of pelvic fluid, cystoscopy on 8/13/21.     In the ED, the patient's symptoms were managed with ondansetron and hydromorphone, with improvement in symptoms upon reassessment.     CT demonstrated small bowel obstruction and diffuse metastatic disease.  Discussed with the patient that we could admit her here or transfer to [facility], where she has had all of her previous care.  Patient reports preference for transfer, which is also indicated by lack of currently available inpatient beds here.  [facility] contacted for transfer.      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

## 2022-02-05 NOTE — PROGRESS NOTES
HD2 admitted with concern for SBO. A&Ox4, VSS on room air. Denies pain. NG placed in ED. OK to use. Up independently. NPO. K+ was 3.2 20 MEQ given recheck at 1600. IVM running at 100/hr in PIV.

## 2022-02-06 LAB
ANION GAP SERPL CALCULATED.3IONS-SCNC: 9 MMOL/L (ref 3–14)
BUN SERPL-MCNC: 10 MG/DL (ref 7–30)
CALCIUM SERPL-MCNC: 8.9 MG/DL (ref 8.5–10.1)
CHLORIDE BLD-SCNC: 104 MMOL/L (ref 94–109)
CO2 SERPL-SCNC: 25 MMOL/L (ref 20–32)
CREAT SERPL-MCNC: 0.61 MG/DL (ref 0.52–1.04)
GFR SERPL CREATININE-BSD FRML MDRD: >90 ML/MIN/1.73M2
GLUCOSE BLD-MCNC: 65 MG/DL (ref 70–99)
GLUCOSE BLDC GLUCOMTR-MCNC: 131 MG/DL (ref 70–99)
GLUCOSE BLDC GLUCOMTR-MCNC: 59 MG/DL (ref 70–99)
HOLD SPECIMEN: NORMAL
MAGNESIUM SERPL-MCNC: 1.5 MG/DL (ref 1.8–2.6)
MAGNESIUM SERPL-MCNC: 1.6 MG/DL (ref 1.8–2.6)
PHOSPHATE SERPL-MCNC: 2.8 MG/DL (ref 2.5–4.5)
POTASSIUM BLD-SCNC: 3.3 MMOL/L (ref 3.4–5.3)
POTASSIUM BLD-SCNC: 3.4 MMOL/L (ref 3.4–5.3)
SODIUM SERPL-SCNC: 138 MMOL/L (ref 133–144)

## 2022-02-06 PROCEDURE — 250N000009 HC RX 250: Performed by: STUDENT IN AN ORGANIZED HEALTH CARE EDUCATION/TRAINING PROGRAM

## 2022-02-06 PROCEDURE — 80048 BASIC METABOLIC PNL TOTAL CA: CPT | Performed by: STUDENT IN AN ORGANIZED HEALTH CARE EDUCATION/TRAINING PROGRAM

## 2022-02-06 PROCEDURE — 84132 ASSAY OF SERUM POTASSIUM: CPT | Performed by: OBSTETRICS & GYNECOLOGY

## 2022-02-06 PROCEDURE — 250N000011 HC RX IP 250 OP 636: Performed by: STUDENT IN AN ORGANIZED HEALTH CARE EDUCATION/TRAINING PROGRAM

## 2022-02-06 PROCEDURE — 83735 ASSAY OF MAGNESIUM: CPT | Performed by: STUDENT IN AN ORGANIZED HEALTH CARE EDUCATION/TRAINING PROGRAM

## 2022-02-06 PROCEDURE — 250N000013 HC RX MED GY IP 250 OP 250 PS 637: Performed by: OBSTETRICS & GYNECOLOGY

## 2022-02-06 PROCEDURE — 250N000011 HC RX IP 250 OP 636: Performed by: OBSTETRICS & GYNECOLOGY

## 2022-02-06 PROCEDURE — 258N000003 HC RX IP 258 OP 636: Performed by: STUDENT IN AN ORGANIZED HEALTH CARE EDUCATION/TRAINING PROGRAM

## 2022-02-06 PROCEDURE — 84100 ASSAY OF PHOSPHORUS: CPT | Performed by: STUDENT IN AN ORGANIZED HEALTH CARE EDUCATION/TRAINING PROGRAM

## 2022-02-06 PROCEDURE — 258N000001 HC RX 258: Performed by: STUDENT IN AN ORGANIZED HEALTH CARE EDUCATION/TRAINING PROGRAM

## 2022-02-06 PROCEDURE — 120N000002 HC R&B MED SURG/OB UMMC

## 2022-02-06 PROCEDURE — 36415 COLL VENOUS BLD VENIPUNCTURE: CPT | Performed by: STUDENT IN AN ORGANIZED HEALTH CARE EDUCATION/TRAINING PROGRAM

## 2022-02-06 PROCEDURE — 36415 COLL VENOUS BLD VENIPUNCTURE: CPT | Performed by: OBSTETRICS & GYNECOLOGY

## 2022-02-06 RX ORDER — DEXTROSE MONOHYDRATE 25 G/50ML
25-50 INJECTION, SOLUTION INTRAVENOUS
Status: DISCONTINUED | OUTPATIENT
Start: 2022-02-06 | End: 2022-02-11 | Stop reason: HOSPADM

## 2022-02-06 RX ORDER — POTASSIUM CHLORIDE 7.45 MG/ML
10 INJECTION INTRAVENOUS
Status: COMPLETED | OUTPATIENT
Start: 2022-02-06 | End: 2022-02-06

## 2022-02-06 RX ORDER — MAGNESIUM SULFATE HEPTAHYDRATE 40 MG/ML
2 INJECTION, SOLUTION INTRAVENOUS ONCE
Status: COMPLETED | OUTPATIENT
Start: 2022-02-06 | End: 2022-02-06

## 2022-02-06 RX ORDER — HYDRALAZINE HYDROCHLORIDE 20 MG/ML
5 INJECTION INTRAMUSCULAR; INTRAVENOUS EVERY 6 HOURS PRN
Status: DISCONTINUED | OUTPATIENT
Start: 2022-02-06 | End: 2022-02-11 | Stop reason: HOSPADM

## 2022-02-06 RX ORDER — MAGNESIUM SULFATE HEPTAHYDRATE 40 MG/ML
2 INJECTION, SOLUTION INTRAVENOUS ONCE
Status: DISCONTINUED | OUTPATIENT
Start: 2022-02-06 | End: 2022-02-06

## 2022-02-06 RX ORDER — POTASSIUM CHLORIDE 7.45 MG/ML
10 INJECTION INTRAVENOUS
Status: DISCONTINUED | OUTPATIENT
Start: 2022-02-06 | End: 2022-02-06

## 2022-02-06 RX ORDER — NICOTINE POLACRILEX 4 MG
15-30 LOZENGE BUCCAL
Status: DISCONTINUED | OUTPATIENT
Start: 2022-02-06 | End: 2022-02-11 | Stop reason: HOSPADM

## 2022-02-06 RX ADMIN — TOPICAL ANESTHETIC 0.5 ML: 200 SPRAY DENTAL; PERIODONTAL at 18:20

## 2022-02-06 RX ADMIN — AMLODIPINE BESYLATE 10 MG: 10 TABLET ORAL at 07:46

## 2022-02-06 RX ADMIN — DEXTROSE MONOHYDRATE 50 ML: 25 INJECTION, SOLUTION INTRAVENOUS at 08:12

## 2022-02-06 RX ADMIN — TOPICAL ANESTHETIC 0.5 ML: 200 SPRAY DENTAL; PERIODONTAL at 21:11

## 2022-02-06 RX ADMIN — MAGNESIUM SULFATE IN WATER 2 G: 40 INJECTION, SOLUTION INTRAVENOUS at 22:43

## 2022-02-06 RX ADMIN — FAMOTIDINE 20 MG: 10 INJECTION, SOLUTION INTRAVENOUS at 18:14

## 2022-02-06 RX ADMIN — POTASSIUM CHLORIDE 10 MEQ: 7.46 INJECTION, SOLUTION INTRAVENOUS at 01:30

## 2022-02-06 RX ADMIN — HYDROMORPHONE HYDROCHLORIDE 0.2 MG: 0.2 INJECTION, SOLUTION INTRAMUSCULAR; INTRAVENOUS; SUBCUTANEOUS at 01:25

## 2022-02-06 RX ADMIN — POTASSIUM CHLORIDE 10 MEQ: 7.46 INJECTION, SOLUTION INTRAVENOUS at 07:46

## 2022-02-06 RX ADMIN — FAMOTIDINE 20 MG: 10 INJECTION, SOLUTION INTRAVENOUS at 06:18

## 2022-02-06 RX ADMIN — POTASSIUM CHLORIDE 10 MEQ: 7.46 INJECTION, SOLUTION INTRAVENOUS at 18:14

## 2022-02-06 RX ADMIN — DEXTROSE MONOHYDRATE AND SODIUM CHLORIDE: 5; .9 INJECTION, SOLUTION INTRAVENOUS at 08:11

## 2022-02-06 RX ADMIN — POTASSIUM CHLORIDE 10 MEQ: 7.46 INJECTION, SOLUTION INTRAVENOUS at 21:06

## 2022-02-06 RX ADMIN — POTASSIUM CHLORIDE 10 MEQ: 7.46 INJECTION, SOLUTION INTRAVENOUS at 06:18

## 2022-02-06 RX ADMIN — DEXTROSE MONOHYDRATE AND SODIUM CHLORIDE: 5; .9 INJECTION, SOLUTION INTRAVENOUS at 23:30

## 2022-02-06 RX ADMIN — ENOXAPARIN SODIUM 40 MG: 40 INJECTION SUBCUTANEOUS at 07:46

## 2022-02-06 ASSESSMENT — ACTIVITIES OF DAILY LIVING (ADL)
ADLS_ACUITY_SCORE: 4

## 2022-02-06 NOTE — PLAN OF CARE
VSS, except elevated BP. Voids spontaneously, no gas, no BM. Strict NPO. Meds to go through NG, going at LIS with low-moderate amount of output. Pain managed with prn dilaudid x1. PIV infusing NS. Up ad rodríguez. Potassium replaced with recheck of 3.4. Another potassium infusion replacement running. Need to schedule lab draw when done. Continue plan of care.

## 2022-02-06 NOTE — DISCHARGE SUMMARY
Gynecologic Oncology Discharge Summary    Liam Jacobsen  [id number]    Admit Date: 2/5/2022  Discharge Date: 2/11/2022  Admitting Provider: Bertrand Werner MD  Discharge Provider: Maxi Lindsey MD    Admission Dx:   - Malignant SBO  - Stage IVB uterine carcinosarcoma   - Hx XL, rad hyst, BSO, debulking to NGRD (8/2021).   - Right breast swelling  - Hypertension   - Sickle cell anemia     Discharge Dx:  - Malignant SBO, resolved  - Stage IVB uterine carcinosarcoma   - Hx XL, rad hyst, BSO, debulking to NGRD (8/2021).   - Right breast swelling  - Hypertension   - Sickle cell anemia     Procedures:   - NGT placement/removal     Prior to Admission Medications:  Medications Prior to Admission   Medication Sig Dispense Refill Last Dose    acetaminophen (TYLENOL) 325 MG tablet Take 650 mg by mouth every 6 hours as needed   2/3/2022    amLODIPine (NORVASC) 10 MG tablet Take 10 mg by mouth daily   2/3/2022    CHOLECALCIFEROL PO Take 1 tablet by mouth daily   2/3/2022    folic acid (FOLVITE) 1 MG tablet Take 1,000 mcg by mouth daily   2/3/2022    ibuprofen (ADVIL/MOTRIN) 600 MG tablet Take 600 mg by mouth every 6 hours as needed   2/3/2022 at prn    loratadine (CLARITIN) 10 MG tablet Take 10 mg by mouth 2 times daily as needed   2/3/2022 at prn    LORazepam (ATIVAN) 0.5 MG tablet Take 0.5 mg by mouth every 6 hours as needed   2/3/2022 at prn    Multiple Vitamins-Minerals (CENTRUM MULTIGUMMIES) CHEW Take 2 tablets by mouth daily   2/3/2022    ondansetron (ZOFRAN) 8 MG tablet Take 8 mg by mouth 3 times daily as needed   2/3/2022 at prn    senna-docusate (SENOKOT-S/PERICOLACE) 8.6-50 MG tablet Take 1 tablet by mouth 2 times daily as needed   2/3/2022 at prn       Discharge Medications:     Review of your medicines        CONTINUE these medicines which have NOT CHANGED        Dose / Directions   acetaminophen 325 MG tablet  Commonly known as: TYLENOL      Dose: 650 mg  Take 650 mg by mouth every 6 hours as  needed  Refills: 0     amLODIPine 10 MG tablet  Commonly known as: NORVASC      Dose: 10 mg  Take 10 mg by mouth daily  Refills: 0     Centrum MultiGummies Chew      Dose: 2 tablet  Take 2 tablets by mouth daily  Refills: 0     CHOLECALCIFEROL PO      Dose: 1 tablet  Take 1 tablet by mouth daily  Refills: 0     folic acid 1 MG tablet  Commonly known as: FOLVITE      Dose: 1,000 mcg  Take 1,000 mcg by mouth daily  Refills: 0     ibuprofen 600 MG tablet  Commonly known as: ADVIL/MOTRIN      Dose: 600 mg  Take 600 mg by mouth every 6 hours as needed  Refills: 0     loratadine 10 MG tablet  Commonly known as: CLARITIN      Dose: 10 mg  Take 10 mg by mouth 2 times daily as needed  Refills: 0     LORazepam 0.5 MG tablet  Commonly known as: ATIVAN      Dose: 0.5 mg  Take 0.5 mg by mouth every 6 hours as needed  Refills: 0     ondansetron 8 MG tablet  Commonly known as: ZOFRAN      Dose: 8 mg  Take 8 mg by mouth 3 times daily as needed  Refills: 0     senna-docusate 8.6-50 MG tablet  Commonly known as: SENOKOT-S/PERICOLACE      Dose: 1 tablet  Take 1 tablet by mouth 2 times daily as needed  Refills: 0            Consultations:  - Social work  - Dietary    Brief History of Illness:  From H&P: Liam DAYO Jacobsen is a 56 year old female with a history of stage IVB uterine carcinosarcoma s/p surgical debulking last year (8/2021) who presents via the Emergency Department for worsening abdominal pain, nausea, vomiting, and obstipation.      Noted onset of symptoms approximately one week ago with gradually worsening generalized abdominal pain. Baseline constipation worsened with time. Last BM one week ago, and was passing flatus until 3-4 days ago. Became increasingly nauseated today with several bouts of emesis yesterday. Thus prompted her emergent visit.      Today she has yet to vomit but continues to feel nauseated and have abdominal pain. No flatus or BMs as noted above. Denies fevers, chills, chest pain, shortness of  breath. No vaginal bleeding of late. All other ROS negative.      Additionally, the patient notes worsening swelling to her right breast. Plan was to evaluate as an outpatient with breast ultrasound imaging and mammography, but not able to be done in the interim. Does note some swollen glands just adjacent to her breast, but denies lumps to the breast tissue itself. Denies breast tenderness, skin changes, or nipple discharge.     Given above findings and CT A/P results, a malignant SBO was diagnosed. The patient was admitted for subsequent treatment.     Hospital Course:  Dz:   - Stage IVB uterine carcinosarcoma most recently status post 5 cycles of adjuvant carbo/taxol (last 2/5). CT A/P at admission revealed extensive peritoneal mets and mid SBO with extensive lymph node enlargement consistent with chemotherapy refractory disease. We did discuss with her during her hospitalization the prognostic implications of this as well as the challenges with effective chemotherapy medications in this setting. However, options for management in the event of resolution of her bowel dysfunction would include possible additional chemotherapy or best supportive care +/- hospice enrollment. She was interested in pursuing further cancer directed therapy, thus she was scheduled for follow-up with her primary oncologist after discharge to discuss treatment options.  FEN:   - At the time of admission, the patient was started on IVF for supplementation. Electrolytes were significant for hypokalemia that required supplementation. Prior to discharge, she was declining IVF and electrolyte supplementation. She was tolerating a low residue diet, no longer had N/V and was passing flatus.  Pain:   - Her pain was initially controlled on IV pain medications.  Once tolerating PO pain meds, she was transitioned to a PO pain regimen.  Her pain was well controlled on this and she was discharged home with these medications.  CV:   - The patient has a  history of hypertension for which she takes Norvasc 10mg daily. Due to ongoing elevations in her blood pressure during admission, IV hydralazine 5mg was added prn. Her vital signs were stable while in house and she had no acute CV issues.  PULM:   - She has no history of pulmonary issues.  She was initially given O2 supplementation in to maintain her O2 sats in the immediate postop period and was transitioned off of this without difficulty.  By discharge, her O2 sats were greater than 94% on RA.  She was encouraged to use her bedside IS while in house.  She had no acute pulmonary issues while in house.  HEME:   - The patient has a history of Sickle Cell Anemia - baseline hemoglobin at admission was 9.9. No acute heme issues in house.   GI:   - Upon admission, an NGT was placed. The patient was made NPO and given IVF for supplementation. On HD#2, her nausea and abdominal pain had improved. She tolerated clamping trials with minimal NG output, and her NG tube was removed on HD#3. Her diet was slowly advanced. She was able to tolerate this intermittently. She was maintained on IVF until HD#6 and she was tolerating a low residue diet without symptoms.  By discharge, she was tolerating a regular diet without nausea and vomiting and able to maintain her hydration without IVF supplementation. She was passing flatus prior to discharge. She had no acute GI issues while in house.   :    - Prior to discharge, the patient was voiding spontaneously without difficulty.  She had no acute  issues while in house.  ID:   - The patient was AF during her hospitalization.   ENDO:   - No acute issues in house.  PSYCH/NEURO:   - No acute issues in house.  PPX:    - She was given SCDs and lovenox during her hospital course.  She tolerated these prophylactic interventions without incident.  They were discontinued at the time of her discharge.      Discharge Instructions and Follow up:  Ms. Liam Jacobsen was discharged from the  hospital with follow up for     Discharge Diet: Low residue  Discharge Activity: As tolerated  Discharge Follow up: 2/25 at 1:15 PM w/ Dr. Burger    Discharge Disposition:  Discharged to shelter    Discharge Staff: MD Patrice Mccarthy MD, MPH  Ob/Gyn Resident, PGY-1    I agree with the note as documented above which I have edited as necessary. I have seen the patient on the day of discharge. She is stable for discharge.   Maxi Lindsey MD

## 2022-02-06 NOTE — PROVIDER NOTIFICATION
Notified provider regarding pt BG of 59. Provider placed order for IV medications. Awaiting for pharmacy to verify.

## 2022-02-06 NOTE — PROGRESS NOTES
"SPIRITUAL HEALTH SERVICES  SPIRITUAL ASSESSMENT Progress Note  [facility] ([place]) 7C   ON-CALL VISIT    REFERRAL SOURCE: Hospital  request upon admission.    Pt Liam identifies with no Sikhism and is of  descent.     When assessing her spirituality, Liam stated that, \"I was raised Taoism, but I believe in God\".     Liam also shared that she left Alevism and did not want to disclose her reasons at this time stating that, \"It is something personal\".     Liam did welcome me to pray for her healing/restoration. We prayed together at her request.     PLAN: Unit  will be notified for follow up.     Shemar Moreno  [employer]   Pager [phone]    [facility] remains available 24/7 for emergent requests/referrals, either by having the switchboard page the on-call  or by entering an ASAP/STAT consult in Epic (this will also page the on-call ).    "

## 2022-02-06 NOTE — PROGRESS NOTES
"Gynecology Oncology Progress Note  02/06/22    HD#2 malignant SBO      Dz: Stage IVB uterine carcinosarcoma. s/p XL, rad hyst, BSO, debulking to NGRD (8/2021). CT A/P (2/5) w/ extensive peritoneal mets, mid-SBO, extens. LN enlargement.     24 hour events:   - Admitted to Gyn Onc service   - NGT to LIS, NPO with IVF   - No ongoing nausea overnight      Subjective: Patient is doing well this morning. States that NGT irritating throat but otherwise feeling alright. No nausea overnight. Pain minimal. Did not pass flatus overnight or have BM, but thinks abdomen is less distended. Feeling hungry this morning. Denies chest pain, SOB, nausea/vomiting, fevers/chills, dizziness. All other ROS negative.     Objective:   Patient Vitals for the past 24 hrs:   BP Temp Temp src Pulse Resp SpO2 Height   02/05/22 2314 (!) 154/80 98.7  F (37.1  C) Oral 96 20 96 % --   02/05/22 1900 (!) 157/84 99.2  F (37.3  C) Oral 99 16 96 % --   02/05/22 1437 (!) 157/80 99.2  F (37.3  C) Oral 96 16 96 % --   02/05/22 1400 (!) 164/85 -- -- 97 -- 97 % --   02/05/22 1359 -- -- -- -- -- 98 % --   02/05/22 1352 -- -- -- -- -- 98 % --   02/05/22 1351 (!) 171/85 -- -- 98 -- 98 % --   02/05/22 1349 -- -- -- -- -- -- 1.575 m (5' 2\")   02/05/22 1300 (!) 156/82 -- -- 98 -- -- --   02/05/22 0922 (!) 145/86 -- -- 93 -- -- --   02/05/22 0600 138/76 -- -- 91 -- 96 % --   02/05/22 0500 (!) 145/73 -- -- 95 -- 95 % --     General: NAD, appears comfortable in bed  CV: RRR, no m/r/g  Resp: CTAB, no wheezes  Abdomen: soft, minimally pan-tender, non-distended  Extremities: warm, well-perfused, nontender, trace edema  GI: NGT in place    I/O last 3 completed shifts:  In: 980 [I.V.:800; NG/GT:180]  Out: 250 [Urine:125; Emesis/NG output:125]    Lines/Drains: PIV, NGT     New labs/imaging-  Recent Results (from the past 24 hour(s))   Asymptomatic COVID-19 Virus (Coronavirus) by PCR Nasopharyngeal    Collection Time: 02/05/22  6:43 AM    Specimen: Nasopharyngeal; Swab "   Result Value Ref Range    SARS CoV2 PCR Negative Negative, Testing sent to reference lab. Results will be returned via unsolicited result   Basic metabolic panel    Collection Time: 02/05/22 11:53 AM   Result Value Ref Range    Sodium 139 133 - 144 mmol/L    Potassium 3.2 (L) 3.4 - 5.3 mmol/L    Chloride 101 94 - 109 mmol/L    Carbon Dioxide (CO2) 29 20 - 32 mmol/L    Anion Gap 9 3 - 14 mmol/L    Urea Nitrogen 15 7 - 30 mg/dL    Creatinine 0.73 0.52 - 1.04 mg/dL    Calcium 9.0 8.5 - 10.1 mg/dL    Glucose 85 70 - 99 mg/dL    GFR Estimate >90 >60 mL/min/1.73m2   Phosphorus    Collection Time: 02/05/22 11:53 AM   Result Value Ref Range    Phosphorus 4.4 2.5 - 4.5 mg/dL   UA reflex to Microscopic and Culture    Collection Time: 02/05/22  1:49 PM    Specimen: Urine, Clean Catch   Result Value Ref Range    Color Urine Light Yellow Colorless, Straw, Light Yellow, Yellow    Appearance Urine Clear Clear    Glucose Urine Negative Negative mg/dL    Bilirubin Urine Negative Negative    Ketones Urine 20  (A) Negative mg/dL    Specific Gravity Urine 1.025 1.003 - 1.035    Blood Urine Negative Negative    pH Urine 6.5 5.0 - 7.0    Protein Albumin Urine 30  (A) Negative mg/dL    Urobilinogen Urine Normal Normal, 2.0 mg/dL    Nitrite Urine Negative Negative    Leukocyte Esterase Urine Negative Negative    RBC Urine 2 <=2 /HPF    WBC Urine 2 <=5 /HPF    Squamous Epithelials Urine 2 (H) <=1 /HPF    Transitional Epithelials Urine <1 <=1 /HPF   Potassium    Collection Time: 02/05/22  3:53 PM   Result Value Ref Range    Potassium 3.3 (L) 3.4 - 5.3 mmol/L   Potassium    Collection Time: 02/05/22  9:26 PM   Result Value Ref Range    Potassium 3.3 (L) 3.4 - 5.3 mmol/L   Basic metabolic panel    Collection Time: 02/06/22  3:36 AM   Result Value Ref Range    Sodium 138 133 - 144 mmol/L    Potassium 3.4 3.4 - 5.3 mmol/L    Chloride 104 94 - 109 mmol/L    Carbon Dioxide (CO2) 25 20 - 32 mmol/L    Anion Gap 9 3 - 14 mmol/L    Urea Nitrogen 10  7 - 30 mg/dL    Creatinine 0.61 0.52 - 1.04 mg/dL    Calcium 8.9 8.5 - 10.1 mg/dL    Glucose 65 (L) 70 - 99 mg/dL    GFR Estimate >90 >60 mL/min/1.73m2   Phosphorus    Collection Time: 02/06/22  3:36 AM   Result Value Ref Range    Phosphorus 2.8 2.5 - 4.5 mg/dL     Assessment/Plan: Quantte P Ann-Marie is a 56 year old now HD#2 with malignant SBO.       Dz: Stage IVB uterine carcinosarcoma. s/p XL, rad hyst, BSO, debulking to NGRD (8/2021). CT A/P (2/5) w/ extensive peritoneal mets, mid-SBO, extens. LN enlargement.     Malignant SBO  FEN: NPO, NGT to LIS, NS 100cc/hr. HypoK 3.2 > ERP x2 > AM pending   Pain: prn IV Dilaudid - minimal use overnight.   Heme: Sickle cell anemia. Hgb 9.9.   CV: HTN - PTA Norvasc 10mg (ord'd)   Pulm: IS  GI: Prn IV zofran/compazine, Pepcid BID - minimal nausea overnight.   : UA nl. NI.   Psych/Neuro/MSK: NI  PPX: SCDs, IS, Lovenox   Drains/Lines: PIV, NGT     Right Breast Swelling   - Plan for outpatient follow up, but not able to be performed.  - CT A/P w/ thickened R breast tissue. Patient describes longstanding   - US Breast ord'd for inpatient.     Hypertension   - PTA Norvasc   - Remained hypertensive overnight though SBP < 160. Can consider addition IV Hydralazine for sustained SBP > 170.    Lisa Brown MD PGY-2  [facility] Gynecology Oncology   Gyn Onc Pager: [phone]  02/06/22 0700 AM    Physician Attestation   I, Nidhi Burt MD saw this patient with the resident and agree with the findings and plan of care as documented in the note.  I personally reviewed vital signs, medications, and labs. Comments:     Low output from NG since placement, patient endorses passing flatus this morning and request NG removal. Discussed clamp trial today with possible removal in AM.    Right breast swelling - plan for breast US per primary GynOnc on Friday. Ordered for patient while here.     Nidhi Burt MD  Gynecology Oncology Fellow

## 2022-02-06 NOTE — PLAN OF CARE
A&Ox4. Hypertensive but not in notifying parameters. AOVSS on RA. No pain or nausea reported. Strict NPO. NG to LIS. Meds given via NG. No gas/BM. Voiding spontaneously. Right breast swollen and tender, ice packs applied. Up ad rodríguez in room and halls. PIV with IVMF. Potassium replaced x2 today, recheck was 3.3. Replacement ordered. Continue to monitor and follow POC.

## 2022-02-07 LAB
ANION GAP SERPL CALCULATED.3IONS-SCNC: 7 MMOL/L (ref 3–14)
BUN SERPL-MCNC: 6 MG/DL (ref 7–30)
CALCIUM SERPL-MCNC: 9 MG/DL (ref 8.5–10.1)
CHLORIDE BLD-SCNC: 104 MMOL/L (ref 94–109)
CO2 SERPL-SCNC: 25 MMOL/L (ref 20–32)
CREAT SERPL-MCNC: 0.58 MG/DL (ref 0.52–1.04)
GFR SERPL CREATININE-BSD FRML MDRD: >90 ML/MIN/1.73M2
GLUCOSE BLD-MCNC: 86 MG/DL (ref 70–99)
GLUCOSE BLDC GLUCOMTR-MCNC: 82 MG/DL (ref 70–99)
MAGNESIUM SERPL-MCNC: 1.5 MG/DL (ref 1.8–2.6)
PHOSPHATE SERPL-MCNC: 2 MG/DL (ref 2.5–4.5)
POTASSIUM BLD-SCNC: 3.2 MMOL/L (ref 3.4–5.3)
POTASSIUM BLD-SCNC: 3.5 MMOL/L (ref 3.4–5.3)
SODIUM SERPL-SCNC: 136 MMOL/L (ref 133–144)

## 2022-02-07 PROCEDURE — 84132 ASSAY OF SERUM POTASSIUM: CPT | Performed by: OBSTETRICS & GYNECOLOGY

## 2022-02-07 PROCEDURE — 250N000011 HC RX IP 250 OP 636: Performed by: OBSTETRICS & GYNECOLOGY

## 2022-02-07 PROCEDURE — 36415 COLL VENOUS BLD VENIPUNCTURE: CPT

## 2022-02-07 PROCEDURE — 99232 SBSQ HOSP IP/OBS MODERATE 35: CPT | Mod: GC | Performed by: OBSTETRICS & GYNECOLOGY

## 2022-02-07 PROCEDURE — 84132 ASSAY OF SERUM POTASSIUM: CPT

## 2022-02-07 PROCEDURE — 250N000013 HC RX MED GY IP 250 OP 250 PS 637: Performed by: OBSTETRICS & GYNECOLOGY

## 2022-02-07 PROCEDURE — 258N000001 HC RX 258: Performed by: NURSE PRACTITIONER

## 2022-02-07 PROCEDURE — 250N000011 HC RX IP 250 OP 636: Performed by: STUDENT IN AN ORGANIZED HEALTH CARE EDUCATION/TRAINING PROGRAM

## 2022-02-07 PROCEDURE — 120N000002 HC R&B MED SURG/OB UMMC

## 2022-02-07 PROCEDURE — 250N000009 HC RX 250: Performed by: STUDENT IN AN ORGANIZED HEALTH CARE EDUCATION/TRAINING PROGRAM

## 2022-02-07 PROCEDURE — 36415 COLL VENOUS BLD VENIPUNCTURE: CPT | Performed by: OBSTETRICS & GYNECOLOGY

## 2022-02-07 PROCEDURE — 83735 ASSAY OF MAGNESIUM: CPT

## 2022-02-07 PROCEDURE — 84100 ASSAY OF PHOSPHORUS: CPT

## 2022-02-07 RX ORDER — POTASSIUM CHLORIDE 7.45 MG/ML
10 INJECTION INTRAVENOUS
Status: COMPLETED | OUTPATIENT
Start: 2022-02-07 | End: 2022-02-07

## 2022-02-07 RX ORDER — MAGNESIUM SULFATE HEPTAHYDRATE 40 MG/ML
4 INJECTION, SOLUTION INTRAVENOUS ONCE
Status: COMPLETED | OUTPATIENT
Start: 2022-02-07 | End: 2022-02-07

## 2022-02-07 RX ORDER — DEXTROSE MONOHYDRATE, SODIUM CHLORIDE, AND POTASSIUM CHLORIDE 50; 1.49; 9 G/1000ML; G/1000ML; G/1000ML
INJECTION, SOLUTION INTRAVENOUS CONTINUOUS
Status: DISCONTINUED | OUTPATIENT
Start: 2022-02-07 | End: 2022-02-11

## 2022-02-07 RX ADMIN — POTASSIUM CHLORIDE, DEXTROSE MONOHYDRATE AND SODIUM CHLORIDE: 150; 5; 900 INJECTION, SOLUTION INTRAVENOUS at 08:47

## 2022-02-07 RX ADMIN — FAMOTIDINE 20 MG: 10 INJECTION, SOLUTION INTRAVENOUS at 18:18

## 2022-02-07 RX ADMIN — POTASSIUM CHLORIDE 10 MEQ: 7.46 INJECTION, SOLUTION INTRAVENOUS at 08:47

## 2022-02-07 RX ADMIN — POTASSIUM & SODIUM PHOSPHATES POWDER PACK 280-160-250 MG 1 PACKET: 280-160-250 PACK at 21:32

## 2022-02-07 RX ADMIN — POTASSIUM & SODIUM PHOSPHATES POWDER PACK 280-160-250 MG 1 PACKET: 280-160-250 PACK at 16:35

## 2022-02-07 RX ADMIN — MAGNESIUM SULFATE IN WATER 4 G: 40 INJECTION, SOLUTION INTRAVENOUS at 21:32

## 2022-02-07 RX ADMIN — AMLODIPINE BESYLATE 10 MG: 10 TABLET ORAL at 08:53

## 2022-02-07 RX ADMIN — FAMOTIDINE 20 MG: 10 INJECTION, SOLUTION INTRAVENOUS at 06:17

## 2022-02-07 RX ADMIN — POTASSIUM CHLORIDE 10 MEQ: 7.46 INJECTION, SOLUTION INTRAVENOUS at 06:17

## 2022-02-07 RX ADMIN — ENOXAPARIN SODIUM 40 MG: 40 INJECTION SUBCUTANEOUS at 08:54

## 2022-02-07 RX ADMIN — POTASSIUM CHLORIDE, DEXTROSE MONOHYDRATE AND SODIUM CHLORIDE: 150; 5; 900 INJECTION, SOLUTION INTRAVENOUS at 20:11

## 2022-02-07 ASSESSMENT — ACTIVITIES OF DAILY LIVING (ADL)
ADLS_ACUITY_SCORE: 4

## 2022-02-07 ASSESSMENT — MIFFLIN-ST. JEOR: SCORE: 1213.25

## 2022-02-07 NOTE — PROVIDER NOTIFICATION
Notified Resident at 10:59 AM regarding pt refusing lab.      Spoke with: Resident    Orders were not obtained.    Comments: Team will come and talk w/ pt about POC

## 2022-02-07 NOTE — PLAN OF CARE
VSS. Voids spontaneously, passing gas, no BM. NPO, can have ice chips. Denies pain and nausea. Some mouth soreness, gave hurricaine spray x1. NG clamped. Checked residual @2300 with 20 ml out, provider instructed to keep clamped over night.  Up ad rodríguez. Mg replaced, recheck this AM. K+ replaced with recheck of 3.2, K+ replacing again-infusing currently (1st bag), need to set recheck an hour after 2nd bag is done. PIV infusing IVMF between electrolytes. Continue plan of care.

## 2022-02-07 NOTE — CONSULTS
"Care Management Follow Up    Length of Stay (days): 2    Expected Discharge Date: 02/08/2022     Concerns to be Addressed: Homelessness   Patient plan of care discussed at interdisciplinary rounds: Yes    Anticipated Discharge Disposition: Shelter       Anticipated Discharge Services: none   Anticipated Discharge DME: none     Patient/family educated on Medicare website which has current facility and service quality ratings: N/A  Education Provided on the Discharge Plan:    Patient/Family in Agreement with the Plan:      Referrals Placed by CM/SW: None   Private pay costs discussed: Not applicable    Additional Information:     met with pt at bedside to discuss housing concerns. SW introduced self and why writer was there. Pt was accepting of SW's visit, but reports that she is doing ok on her own with her housing needs and concerns. Pt is currently staying at [facility] with [facility] for temporary shelter.      Pt reports she has two : Ragini Padilla from [facility], and Erendira Dunne from [facility]. Pt does not have contact information for either with her, but does have contact information at her shelter. Pt gave permission for SW to speak with either of them if necessary.     Pt reports she has a phone interview for social security eligibility on Wednesday February 9th at 9:00AM. Pt may qualify for social security but right now she reports that her case is \"pending\". Pt reports that once she gets on social security that she can provide income report to the [place] [employer] who she has been working with to obtain her own housing. Pt is hopeful that this process won't take very long.      Pt feels supported by her . Pt reports she does not need assistance from hospital  at this time.  gave pt her direct contact phone number and name in case any needs arise.       PRABHJOT Short, BIBI   7C    Phone: [phone]  Pager: " [phone]

## 2022-02-07 NOTE — PLAN OF CARE
AOx4. HTN at times but within parameters; OVSS on ra. UAL, ambulating halls. NG clamped per MD. Next residual check 2200; page gyn/onc with residual check and page team with results. Pt reporting flatus, no BM yet. Voiding but not saving; pt reporting difficulty saving. Pt reporting throat pain; PRN Hurricane spray given with some relief. PIV infusing with IVMF. Pt had low BG at beginning of shift; PRN Dextrose 50% given via IV. Pt started on IV fluids with dextrose. BG recheck WDL. K+ replacement given; recheck continues to be low. K+ replacement started this jessica. Mg+ low, MD notified and placed replacement orders but pt does not meet criteria for replacement.     Addendum Mg+ redraw came back and now within parameters to replace. Replacment ordered.

## 2022-02-07 NOTE — PROGRESS NOTES
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 S/p 5C Carbo/Taxol. CT A/P () w/ extensive peritoneal mets, mid-SBO, extensive LN enlargement.     24 hour events:   - Tolerated clamping trials with minimal NG output   - Potassium and magnesium repletion     Subjective: Feeling well. Pain well controlled. Feeling very hungry. Passing flatus. Voiding spontaneously. Ambulating without dizziness. Denies fevers, chills, chest pain, SOB, abdominal pain, nausea, emesis.    Objective:   Patient Vitals for the past 24 hrs:   BP Temp Temp src Pulse Resp SpO2   22 2310 (!) 162/73 98.2  F (36.8  C) Oral 100 20 97 %   22 1300 (!) 151/79 98.5  F (36.9  C) Oral 93 18 96 %   22 0854 (!) 148/82 97.8  F (36.6  C) Oral 99 16 97 %       EXAM  General: in NAD  CV: RR  Resp: No increased work of breathing  Abdomen: soft, nontender, mildly distended  Extremities: nontender, no edema    I/Os  (Yesterday // Since Midnight)  PO: 0 ml // 0 ml  IV: 1880 ml // 1200 ml  Urine 350 ml, 3x unmeasured // NC  N ml // NC    Labs/Imaging:  Results for orders placed or performed during the hospital encounter of 22 (from the past 24 hour(s))   Glucose by meter   Result Value Ref Range    GLUCOSE BY METER POCT 59 (L) 70 - 99 mg/dL   Glucose by meter   Result Value Ref Range    GLUCOSE BY METER POCT 131 (H) 70 - 99 mg/dL   Magnesium   Result Value Ref Range    Magnesium 1.5 (L) 1.8 - 2.6 mg/dL   Extra Tube    Narrative    The following orders were created for panel order Extra Tube.  Procedure                               Abnormality         Status                     ---------                               -----------         ------                     Extra Purple Top Tube[[id number]]                            Final result                 Please view results  for these tests on the individual orders.   Extra Purple Top Tube   Result Value Ref Range    Hold Specimen JIC    Potassium   Result Value Ref Range    Potassium 3.3 (L) 3.4 - 5.3 mmol/L   Potassium   Result Value Ref Range    Potassium 3.2 (L) 3.4 - 5.3 mmol/L     Assessment: 56 year old HD#3 admitted with small bowel obstruction which is now resolving with conservative management     PLAN:    Dz: Stage IVB uterine carcinosarcoma s/p XL, rad hyst, BSO, debulking to [facility] (8/2021). S/p 5C Carbo/Taxol. CT A/P (2/5) w/ extensive peritoneal mets, mid-SBO, extensive LN enlargement.   - Plan for goals of care discussion today with consideration for hospice given progressive disease despite first line chemotherapy.     # Small bowel obstruction- resolving   - Remove NGT  - Advance diet as tolerated   - Monitor I/Os  - Replete electrolytes PRN per protocol   - PRN antiemetics   - Pepcid BID    # HTN  - Continue PTA Norvasc  - PRN Hydralazine for hypertensive urgency     # Thickened right breast tissue  - Incidental finding on recent CT  - Will attempt to obtain breast US through breast center imaging today     # Houseless   - Living in shelter   - SW consult for dispo planning     # Sickle cell anemia   - No treatments indicated at this time     Lines/Drains: NGT, PIV  Dispo: Anticipate discharge to shelter when tolerating sufficient PO intake     Yonis Nguyen MD  Ob/Gyn Resident, PGY-3  02/07/2022 5:32 AM      Gynecologic Oncology Attending Attestation  I have seen the patient on rounds with the team. SBO resolved, clamp trial with minimal residual. Remove NGT and advance diet as tolerated to low residual. Start slow with clears. Reviewed CT findings with the patient which show progression of disease on chemo consistent with platinum-refractory uterine cancer. We discussed the poor prognosis associated with this finding. Reviewed options for management including further chemotherapy or best supportive care. We discussed  "that in order to receive treatment she must be able to support herself nutritionally, so if SBO recurred, there may not be further treatment options. However, currently SBO has resolved. She is adamantly against hospice which \"is giving up\". She would like further therapy. We will discharge with plans for outpatient follow-up with Dr. Burger within 1-2 weeks to discuss next steps. Discontinue carbo/taxol given progression. We spent a total of 30 min on the care of Liam Jacobsen on the day of service.  I have discussed the patient and plan of care with the resident as documented in the note above, which I have edited as necessary.    Maxi Lindsey MD    Gynecologic Oncology     "

## 2022-02-07 NOTE — PLAN OF CARE
Pt vss, except tachycardic, and A&Ox4. Denies pain. NG removed this shift- denies nausea. Tolerating low fiber diet well. Up ad rodríguez and ambulated in hallway this shift. Voids spontaneously (not collecting), passing gas, but no BM this shift. R PIV infusing with D5NS + 20 KCl at 100 mL/hr. Pt given recommendation from team about hospice care, but pt verbalizes that she wants to get another opinion at a separate hospital. Will continue plan of care.

## 2022-02-08 LAB
ANION GAP SERPL CALCULATED.3IONS-SCNC: 8 MMOL/L (ref 3–14)
BUN SERPL-MCNC: 6 MG/DL (ref 7–30)
CALCIUM SERPL-MCNC: 9 MG/DL (ref 8.5–10.1)
CHLORIDE BLD-SCNC: 106 MMOL/L (ref 94–109)
CO2 SERPL-SCNC: 26 MMOL/L (ref 20–32)
CREAT SERPL-MCNC: 0.53 MG/DL (ref 0.52–1.04)
GFR SERPL CREATININE-BSD FRML MDRD: >90 ML/MIN/1.73M2
GLUCOSE BLD-MCNC: 101 MG/DL (ref 70–99)
MAGNESIUM SERPL-MCNC: 2.2 MG/DL (ref 1.8–2.6)
PHOSPHATE SERPL-MCNC: 2.8 MG/DL (ref 2.5–4.5)
PLATELET # BLD AUTO: 162 10E3/UL (ref 150–450)
POTASSIUM BLD-SCNC: 3.4 MMOL/L (ref 3.4–5.3)
POTASSIUM BLD-SCNC: 3.7 MMOL/L (ref 3.4–5.3)
SODIUM SERPL-SCNC: 140 MMOL/L (ref 133–144)

## 2022-02-08 PROCEDURE — 84100 ASSAY OF PHOSPHORUS: CPT | Performed by: NURSE PRACTITIONER

## 2022-02-08 PROCEDURE — 250N000009 HC RX 250: Performed by: STUDENT IN AN ORGANIZED HEALTH CARE EDUCATION/TRAINING PROGRAM

## 2022-02-08 PROCEDURE — 36415 COLL VENOUS BLD VENIPUNCTURE: CPT | Performed by: NURSE PRACTITIONER

## 2022-02-08 PROCEDURE — 83735 ASSAY OF MAGNESIUM: CPT | Performed by: NURSE PRACTITIONER

## 2022-02-08 PROCEDURE — 36415 COLL VENOUS BLD VENIPUNCTURE: CPT | Performed by: OBSTETRICS & GYNECOLOGY

## 2022-02-08 PROCEDURE — 84132 ASSAY OF SERUM POTASSIUM: CPT | Performed by: OBSTETRICS & GYNECOLOGY

## 2022-02-08 PROCEDURE — 85049 AUTOMATED PLATELET COUNT: CPT | Performed by: STUDENT IN AN ORGANIZED HEALTH CARE EDUCATION/TRAINING PROGRAM

## 2022-02-08 PROCEDURE — 250N000011 HC RX IP 250 OP 636: Performed by: OBSTETRICS & GYNECOLOGY

## 2022-02-08 PROCEDURE — 999N000127 HC STATISTIC PERIPHERAL IV START W US GUIDANCE

## 2022-02-08 PROCEDURE — 250N000011 HC RX IP 250 OP 636: Performed by: STUDENT IN AN ORGANIZED HEALTH CARE EDUCATION/TRAINING PROGRAM

## 2022-02-08 PROCEDURE — 120N000002 HC R&B MED SURG/OB UMMC

## 2022-02-08 PROCEDURE — 82310 ASSAY OF CALCIUM: CPT | Performed by: NURSE PRACTITIONER

## 2022-02-08 PROCEDURE — 250N000013 HC RX MED GY IP 250 OP 250 PS 637: Performed by: OBSTETRICS & GYNECOLOGY

## 2022-02-08 PROCEDURE — 99232 SBSQ HOSP IP/OBS MODERATE 35: CPT | Performed by: OBSTETRICS & GYNECOLOGY

## 2022-02-08 PROCEDURE — 258N000001 HC RX 258: Performed by: NURSE PRACTITIONER

## 2022-02-08 RX ORDER — POTASSIUM CHLORIDE 7.45 MG/ML
10 INJECTION INTRAVENOUS
Status: COMPLETED | OUTPATIENT
Start: 2022-02-08 | End: 2022-02-08

## 2022-02-08 RX ORDER — ACETAMINOPHEN 325 MG/1
650 TABLET ORAL EVERY 6 HOURS PRN
Status: DISCONTINUED | OUTPATIENT
Start: 2022-02-08 | End: 2022-02-11 | Stop reason: HOSPADM

## 2022-02-08 RX ADMIN — POTASSIUM CHLORIDE 10 MEQ: 7.46 INJECTION, SOLUTION INTRAVENOUS at 14:53

## 2022-02-08 RX ADMIN — FAMOTIDINE 20 MG: 10 INJECTION, SOLUTION INTRAVENOUS at 18:46

## 2022-02-08 RX ADMIN — FAMOTIDINE 20 MG: 10 INJECTION, SOLUTION INTRAVENOUS at 06:07

## 2022-02-08 RX ADMIN — POTASSIUM CHLORIDE, DEXTROSE MONOHYDRATE AND SODIUM CHLORIDE: 150; 5; 900 INJECTION, SOLUTION INTRAVENOUS at 08:50

## 2022-02-08 RX ADMIN — POTASSIUM CHLORIDE 10 MEQ: 7.46 INJECTION, SOLUTION INTRAVENOUS at 18:47

## 2022-02-08 RX ADMIN — ONDANSETRON 4 MG: 2 INJECTION INTRAMUSCULAR; INTRAVENOUS at 03:05

## 2022-02-08 RX ADMIN — POTASSIUM CHLORIDE 10 MEQ: 7.46 INJECTION, SOLUTION INTRAVENOUS at 19:49

## 2022-02-08 RX ADMIN — POTASSIUM CHLORIDE 10 MEQ: 7.46 INJECTION, SOLUTION INTRAVENOUS at 17:36

## 2022-02-08 RX ADMIN — AMLODIPINE BESYLATE 10 MG: 10 TABLET ORAL at 08:57

## 2022-02-08 RX ADMIN — ENOXAPARIN SODIUM 40 MG: 40 INJECTION SUBCUTANEOUS at 08:57

## 2022-02-08 RX ADMIN — POTASSIUM CHLORIDE, DEXTROSE MONOHYDRATE AND SODIUM CHLORIDE: 150; 5; 900 INJECTION, SOLUTION INTRAVENOUS at 17:36

## 2022-02-08 ASSESSMENT — ACTIVITIES OF DAILY LIVING (ADL)
ADLS_ACUITY_SCORE: 4

## 2022-02-08 NOTE — PLAN OF CARE
Pt since emesis earlier has been sleeping. Appears comfortable. Abd dist, round,+bs, semifirm. Pt stated she was very gassy. Zofran given earlier. No further c/o's of nausea. Pt UAL to BR. No stools noted tonite. IVF at 100/hr via piv. Cont to monitor bowel status. Monitor lytes, next draw with am labs.

## 2022-02-08 NOTE — PROGRESS NOTES
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 S/p 5C Carbo/Taxol with disease progression.     24 hour events:   - NGT removed  - Diet advanced to low residue   - Phosphorus repletion   - Emesis x1    Subjective: Feeling well. Emesis x1 overnight, but no longer feeling nauseated.Pain well controlled. No abdominal pain. Passing flatus. BM x2 yesterday.     Objective:   Patient Vitals for the past 24 hrs:   BP Temp Temp src Pulse Resp SpO2 Weight   02/07/22 1503 (!) 159/76 98.7  F (37.1  C) Oral 99 18 97 % --   02/07/22 1208 138/70 98.9  F (37.2  C) Oral 104 18 96 % --   02/07/22 0500 139/70 98.5  F (36.9  C) Oral 95 16 97 % 66 kg (145 lb 8.1 oz)   02/06/22 2310 (!) 162/73 98.2  F (36.8  C) Oral 100 20 97 % --       EXAM  General: in NAD  CV: RR  Resp: No increased work of breathing  Abdomen: soft, nontender, moderately distended  Extremities: nontender, no edema    I/Os  (Yesterday // Since Midnight)  PO: 240 ml // NC  IV: 2620 ml // 850 ml  Urine 3x unmeasured // NC  NG/emesis: 0 ml // 500 ml  Stool: 2x // NC    Labs/Imaging:  Results for orders placed or performed during the hospital encounter of 02/05/22 (from the past 24 hour(s))   Potassium   Result Value Ref Range    Potassium 3.2 (L) 3.4 - 5.3 mmol/L   Glucose by meter   Result Value Ref Range    GLUCOSE BY METER POCT 82 70 - 99 mg/dL   Basic metabolic panel   Result Value Ref Range    Sodium 136 133 - 144 mmol/L    Potassium 3.5 3.4 - 5.3 mmol/L    Chloride 104 94 - 109 mmol/L    Carbon Dioxide (CO2) 25 20 - 32 mmol/L    Anion Gap 7 3 - 14 mmol/L    Urea Nitrogen 6 (L) 7 - 30 mg/dL    Creatinine 0.58 0.52 - 1.04 mg/dL    Calcium 9.0 8.5 - 10.1 mg/dL    Glucose 86 70 - 99 mg/dL    GFR Estimate >90 >60 mL/min/1.73m2   Phosphorus   Result Value Ref Range    Phosphorus 2.0 (L) 2.5 - 4.5 mg/dL      Assessment: 56 year old HD#4 admitted with malignant small bowel obstruction resolving with conservative management     PLAN:    Dz: Stage IVB uterine carcinosarcoma s/p XL, rad hyst, BSO, debulking to [facility] (8/2021). S/p 5C Carbo/Taxol with disease progression.   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 Patient declines hospice/best supportive care and desires further chemotherapy. Will follow up with Dr. Burger for care planning within 1-2 weeks of discharge.      # Small bowel obstruction- resolving  - Continue low residue diet. If ongoing episodes of emesis concerning for ongoing SBO, then will recommend NGT to LIS and bowel rest.   - Continue mIVF until tolerating adequate PO  - Monitor I/Os  - Replete electrolytes PRN per protocols  - PRN antiemetics   - Pepcid BID    # HTN  - Continue PTA Norvasc  - PRN Hydralazine for hypertensive urgency     # Thickened right breast tissue  - Incidental finding on recent CT  - Outpatient follow up for breast ultrasound     # Houseless   - Living in shelter   - SW consult for dispo planning. Will arrange transportation back to shelter on discharge.     # Sickle cell anemia   - No treatments indicated at this time     Ppx: SCDs, Lovenox  Lines/Drains: PIV  Code Status: Full Code  Dispo: Anticipate discharge to shelter today if tolerating PO    Yonis Nguyen MD  Gyn Onc Resident, PGY-3  02/07/2022 7:36 PM   Gyn Onc Pager [phone]    Gynecologic Oncology Attending Attestation  I have seen the patient on rounds with the team. Sleeping comfortably on my rounds. Emesis overnight. Still nauseated this morning. Continue diet. If further emesis, likely will need to address possible replacement of NGT +/- venting Gtube.  I have discussed the patient and plan of care with the resident as documented in the note above, which I have edited as necessary.    Maxi Lindsey MD    Gynecologic Oncology

## 2022-02-08 NOTE — PLAN OF CARE
Assumed cares from [id number]. Hypertensive but not within notifying parameters, OVSS on RA. A&Ox4, UAL. Denying pain throughout shift. Reporting mild intermittent nausea improved with essential oils, refused medications when offered. Phosphorus being replaced orally, recheck ordered for 2/9 AM. Magnesium being replaced IV, recheck in the morning. PIV CDI and running MIVF per orders. Abdomen soft, rounded, bowel sounds normoactive. Two reported bowel movements this shift, not saved. Voiding spontaneously without difficulty. Continue with POC.

## 2022-02-09 LAB
ANION GAP SERPL CALCULATED.3IONS-SCNC: 8 MMOL/L (ref 3–14)
BUN SERPL-MCNC: 5 MG/DL (ref 7–30)
CALCIUM SERPL-MCNC: 8.8 MG/DL (ref 8.5–10.1)
CHLORIDE BLD-SCNC: 108 MMOL/L (ref 94–109)
CO2 SERPL-SCNC: 24 MMOL/L (ref 20–32)
CREAT SERPL-MCNC: 0.56 MG/DL (ref 0.52–1.04)
GFR SERPL CREATININE-BSD FRML MDRD: >90 ML/MIN/1.73M2
GLUCOSE BLD-MCNC: 89 MG/DL (ref 70–99)
HOLD SPECIMEN: NORMAL
MAGNESIUM SERPL-MCNC: 1.4 MG/DL (ref 1.8–2.6)
PHOSPHATE SERPL-MCNC: 2.2 MG/DL (ref 2.5–4.5)
POTASSIUM BLD-SCNC: 3.3 MMOL/L (ref 3.4–5.3)
POTASSIUM BLD-SCNC: 5.3 MMOL/L (ref 3.4–5.3)
SODIUM SERPL-SCNC: 140 MMOL/L (ref 133–144)

## 2022-02-09 PROCEDURE — 82310 ASSAY OF CALCIUM: CPT

## 2022-02-09 PROCEDURE — 84100 ASSAY OF PHOSPHORUS: CPT

## 2022-02-09 PROCEDURE — 250N000011 HC RX IP 250 OP 636: Performed by: STUDENT IN AN ORGANIZED HEALTH CARE EDUCATION/TRAINING PROGRAM

## 2022-02-09 PROCEDURE — 250N000009 HC RX 250: Performed by: OBSTETRICS & GYNECOLOGY

## 2022-02-09 PROCEDURE — 250N000009 HC RX 250: Performed by: STUDENT IN AN ORGANIZED HEALTH CARE EDUCATION/TRAINING PROGRAM

## 2022-02-09 PROCEDURE — 36415 COLL VENOUS BLD VENIPUNCTURE: CPT

## 2022-02-09 PROCEDURE — 84132 ASSAY OF SERUM POTASSIUM: CPT | Performed by: OBSTETRICS & GYNECOLOGY

## 2022-02-09 PROCEDURE — 99232 SBSQ HOSP IP/OBS MODERATE 35: CPT | Performed by: OBSTETRICS & GYNECOLOGY

## 2022-02-09 PROCEDURE — 36415 COLL VENOUS BLD VENIPUNCTURE: CPT | Performed by: OBSTETRICS & GYNECOLOGY

## 2022-02-09 PROCEDURE — 250N000011 HC RX IP 250 OP 636: Performed by: OBSTETRICS & GYNECOLOGY

## 2022-02-09 PROCEDURE — 83735 ASSAY OF MAGNESIUM: CPT

## 2022-02-09 PROCEDURE — 250N000013 HC RX MED GY IP 250 OP 250 PS 637: Performed by: OBSTETRICS & GYNECOLOGY

## 2022-02-09 PROCEDURE — 258N000003 HC RX IP 258 OP 636: Performed by: OBSTETRICS & GYNECOLOGY

## 2022-02-09 PROCEDURE — 120N000002 HC R&B MED SURG/OB UMMC

## 2022-02-09 RX ORDER — POTASSIUM CHLORIDE 7.45 MG/ML
10 INJECTION INTRAVENOUS
Status: COMPLETED | OUTPATIENT
Start: 2022-02-09 | End: 2022-02-09

## 2022-02-09 RX ORDER — MAGNESIUM SULFATE HEPTAHYDRATE 40 MG/ML
4 INJECTION, SOLUTION INTRAVENOUS ONCE
Status: COMPLETED | OUTPATIENT
Start: 2022-02-09 | End: 2022-02-09

## 2022-02-09 RX ADMIN — AMLODIPINE BESYLATE 10 MG: 10 TABLET ORAL at 08:50

## 2022-02-09 RX ADMIN — POTASSIUM PHOSPHATE, MONOBASIC AND POTASSIUM PHOSPHATE, DIBASIC 9 MMOL: 224; 236 INJECTION, SOLUTION, CONCENTRATE INTRAVENOUS at 17:37

## 2022-02-09 RX ADMIN — POTASSIUM CHLORIDE 10 MEQ: 7.46 INJECTION, SOLUTION INTRAVENOUS at 10:02

## 2022-02-09 RX ADMIN — POTASSIUM CHLORIDE 10 MEQ: 7.46 INJECTION, SOLUTION INTRAVENOUS at 08:56

## 2022-02-09 RX ADMIN — POTASSIUM CHLORIDE 10 MEQ: 7.46 INJECTION, SOLUTION INTRAVENOUS at 12:10

## 2022-02-09 RX ADMIN — POTASSIUM CHLORIDE 10 MEQ: 7.46 INJECTION, SOLUTION INTRAVENOUS at 11:12

## 2022-02-09 RX ADMIN — ENOXAPARIN SODIUM 40 MG: 40 INJECTION SUBCUTANEOUS at 08:50

## 2022-02-09 RX ADMIN — FAMOTIDINE 20 MG: 10 INJECTION, SOLUTION INTRAVENOUS at 05:00

## 2022-02-09 RX ADMIN — MAGNESIUM SULFATE IN WATER 4 G: 40 INJECTION, SOLUTION INTRAVENOUS at 14:29

## 2022-02-09 ASSESSMENT — ACTIVITIES OF DAILY LIVING (ADL)
ADLS_ACUITY_SCORE: 4

## 2022-02-09 NOTE — PROGRESS NOTES
"  Plan of Care Note    Reason for Admission: Stage IVB uterine carcinosarcoma   Procedures:n/a  IV Access/Incisions/Drains/Wounds:   IVF: PIV  Infusing D5 % kcl 20%  VS: Blood pressure (!) 151/90, pulse 97, temperature 98.1  F (36.7  C), temperature source Oral, resp. rate 20, height 1.575 m (5' 2\"), weight 66 kg (145 lb 8.1 oz), SpO2 97 %.  Cardia:/90  Activity: Inde with mobility  Pain Management: Denies pain   GI/: adequate Voiding  No BM, passing Flatus, denies Nausea  Neuro: A&Ox4    NPO         "

## 2022-02-09 NOTE — PROGRESS NOTES
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 S/p 5C Carbo/Taxol with disease progression.     24 hour events:   - Emesis x2 > NPO    Subjective: Feeling well. No further emesis, but has been intermittently nauseated.Pain well controlled. No abdominal pain. Passing flatus intermittently. No bowel movements yesterday/today.     Objective:   Patient Vitals for the past 24 hrs:   BP Temp Temp src Pulse Resp SpO2   02/09/22 0609 128/69 98.4  F (36.9  C) Oral 87 20 98 %   02/08/22 2213 (!) 151/90 98.1  F (36.7  C) Oral 97 20 97 %   02/08/22 2029 (!) 155/87 98  F (36.7  C) Oral 96 16 97 %   02/08/22 1409 (!) 154/80 97.9  F (36.6  C) Oral 89 16 94 %   02/08/22 1233 (!) 154/76 98.2  F (36.8  C) Temporal 95 16 96 %   02/08/22 0716 (!) 157/92 98.2  F (36.8  C) Temporal 111 16 97 %     EXAM  General: in NAD  CV: RR  Resp: No increased work of breathing  Abdomen: soft, nontender, moderately distended  Extremities: nontender, no edema    I/Os  (Yesterday // Since Midnight)  PO: NC // 0 ml  IV: 2050 ml // 1101 ml  Urine 1x unmeasured // NC  NG/emesis: 750 ml // NC  Stool: NC // NC    Labs/Imaging:  Results for orders placed or performed during the hospital encounter of 02/05/22 (from the past 24 hour(s))   Potassium   Result Value Ref Range    Potassium 3.7 3.4 - 5.3 mmol/L     Assessment: 56 year old HD#5 admitted with malignant small bowel obstruction resolving with conservative management     PLAN:    Dz: Stage IVB uterine carcinosarcoma s/p XL, rad hyst, BSO, debulking to [facility] (8/2021). S/p 5C Carbo/Taxol with disease progression.   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 Patient declines hospice/best supportive care and desires further chemotherapy. Will follow up with Dr. Burger for care  planning within 1-2 weeks of discharge.      # Small bowel obstruction  - Remain NPO this morning. If symptomatically improved later this morning will slowly ADAT.  - Plan to replace NGT if patient has further emesis and consider venting g-tube placement for definitive management if obstruction does not resolve with conservative management.  - Continue mIVF until tolerating adequate PO  - Monitor I/Os  - Replete electrolytes PRN per protocols  - PRN antiemetics   - Pepcid BID    # HTN  - Continue PTA Norvasc  - PRN Hydralazine for hypertensive urgency     # Thickened right breast tissue  - Incidental finding on recent CT  - Outpatient follow up for breast ultrasound     # Houseless   - Living in shelter   -  consult for dispo planning. Will arrange transportation back to shelter on discharge.     # Sickle cell anemia   - No treatments indicated at this time     Ppx: SCDs, Lovenox  Lines/Drains: PIV  Code Status: Full Code  Dispo: Pending bowel function and PO tolerance. Anticipate discharge to shelter when meeting goals.     Yonis Nguyen MD  Gyn Onc Resident, PGY-3  02/09/2022 6:47 AM   Gyn Onc Pager [phone]    Gynecologic Oncology Attending Attestation  I have seen the patient on rounds with the team. Additional emesis and was NPO but started passing flatus again this morning and feels hungry. Tolerated some clears for breakfast. Will slowly advance her diet. We discussed that she may only be able to tolerate soft foods but we will see how things progress. If continued episodes of emesis, we may have to discuss venting Gtube to palliate symptoms intermittently.   I have discussed the patient and plan of care with the resident as documented in the note above, which I have edited as necessary.    Maxi Lindsey MD    Gynecologic Oncology

## 2022-02-09 NOTE — PLAN OF CARE
Pt hypertensive and A&Ox4. Pain rated as a 7- declined any medications, so ambulated and rest promoted. Up ad rodríguez. Pt had intermittent nausea- declined Zofran, but pt had some relief with sips of cool water and cool cloth. Pt had 1x emesis this shift- team aware. Pt NPO. Voiding spontaneously, passing gas, but no BM this shift. R PIV infusing with D5NS + 20 KCl at 100 mL/hr. IV potassium replacement given- recheck scheduled for 2130. Will continue plan of care.

## 2022-02-09 NOTE — PLAN OF CARE
Assumed care of pt from [id number].    AOx4. UAL ambulating halls. Tolerating diet; reporting fullness but described having this even prior to hospitalization. +flatus. No BM today per pt. PIV with IVMF. Mg+ replaced; currently running and recheck in the AM. K+ replaced this morning and waiting on recheck. Denies pain.

## 2022-02-09 NOTE — PLAN OF CARE
"VSS.  UAL often.  Passing \"a lot\" of gas; no BM. Abdomen distended, soft.  Denies nausea. Diet advanced to LFD, some instruction given. Ate small amount and \"felt full\" so patient stopped.  Instructed to only eat small amounts at a time.   "

## 2022-02-09 NOTE — PROGRESS NOTES
HD5 admitted with SBO. A&Ox4, VSS on room air. Denies pain. NG out. Tolerating a LFD, denies nausea today. Gas today, LBM 2/8. Up independent. K+ replaced, recheck was 5.3 MG++ replaced, recheck ordered. Phos to be replaced. Continue to monitor

## 2022-02-10 LAB
ANION GAP SERPL CALCULATED.3IONS-SCNC: 8 MMOL/L (ref 3–14)
BUN SERPL-MCNC: 4 MG/DL (ref 7–30)
CALCIUM SERPL-MCNC: 8.8 MG/DL (ref 8.5–10.1)
CHLORIDE BLD-SCNC: 106 MMOL/L (ref 94–109)
CO2 SERPL-SCNC: 23 MMOL/L (ref 20–32)
CREAT SERPL-MCNC: 0.5 MG/DL (ref 0.52–1.04)
GFR SERPL CREATININE-BSD FRML MDRD: >90 ML/MIN/1.73M2
GLUCOSE BLD-MCNC: 90 MG/DL (ref 70–99)
MAGNESIUM SERPL-MCNC: 1.5 MG/DL (ref 1.8–2.6)
PHOSPHATE SERPL-MCNC: 2.8 MG/DL (ref 2.5–4.5)
POTASSIUM BLD-SCNC: 3.2 MMOL/L (ref 3.4–5.3)
SODIUM SERPL-SCNC: 137 MMOL/L (ref 133–144)

## 2022-02-10 PROCEDURE — 120N000002 HC R&B MED SURG/OB UMMC

## 2022-02-10 PROCEDURE — 84100 ASSAY OF PHOSPHORUS: CPT

## 2022-02-10 PROCEDURE — 36415 COLL VENOUS BLD VENIPUNCTURE: CPT

## 2022-02-10 PROCEDURE — 99232 SBSQ HOSP IP/OBS MODERATE 35: CPT | Performed by: OBSTETRICS & GYNECOLOGY

## 2022-02-10 PROCEDURE — 83735 ASSAY OF MAGNESIUM: CPT | Performed by: OBSTETRICS & GYNECOLOGY

## 2022-02-10 PROCEDURE — 250N000013 HC RX MED GY IP 250 OP 250 PS 637

## 2022-02-10 PROCEDURE — 258N000001 HC RX 258: Performed by: NURSE PRACTITIONER

## 2022-02-10 PROCEDURE — 250N000013 HC RX MED GY IP 250 OP 250 PS 637: Performed by: OBSTETRICS & GYNECOLOGY

## 2022-02-10 PROCEDURE — 82374 ASSAY BLOOD CARBON DIOXIDE: CPT

## 2022-02-10 RX ADMIN — AMLODIPINE BESYLATE 10 MG: 10 TABLET ORAL at 10:59

## 2022-02-10 RX ADMIN — ACETAMINOPHEN 650 MG: 325 TABLET, FILM COATED ORAL at 20:48

## 2022-02-10 RX ADMIN — POTASSIUM CHLORIDE, DEXTROSE MONOHYDRATE AND SODIUM CHLORIDE: 150; 5; 900 INJECTION, SOLUTION INTRAVENOUS at 01:37

## 2022-02-10 ASSESSMENT — ACTIVITIES OF DAILY LIVING (ADL)
ADLS_ACUITY_SCORE: 4

## 2022-02-10 NOTE — PLAN OF CARE
"VSS, except elevated BP. Voids spontaneously, BM x3-small. Denies pain. Had emesis x1 @2000 yesterday, has felt better since, no nausea. NPO overnight due to emesis. Up ad rodríguez. PIV infusing IVMF. Pt is declining meds, pt stated \"I think my body needs a break from all the meds.\" Mg and Phos recheck scheduled for this AM. Continue plan of care.  "

## 2022-02-10 NOTE — PROGRESS NOTES
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 S/p 5C Carbo/Taxol with disease progression.     24 hour events:   - Diet advance to low residue  - Emesis x1 > NPO  - Electrolyte repletion per protocols      Subjective: Feeling okay. No further emesis overnight. Denies nausea. Having colicky abdominal pain this morning. Not passing flatus.     Objective:   Patient Vitals for the past 24 hrs:   BP Temp Temp src Pulse Resp SpO2   02/09/22 2204 (!) 172/89 97.9  F (36.6  C) Temporal 104 17 94 %   02/09/22 1534 (!) 154/86 97.6  F (36.4  C) Oral 87 20 98 %     EXAM  General: in NAD  CV: RR  Resp: No increased work of breathing  Abdomen: soft, nontender, moderately distended  Extremities: nontender, no edema    I/Os  (Yesterday // Since Midnight)  PO: 1580 ml // 0 ml  IV: 2001 ml // NC  Urine 4x unmeasured // NC  Emesis: 200 ml // NC  Stool: NC // NC    Labs/Imaging:  Results for orders placed or performed during the hospital encounter of 02/05/22 (from the past 24 hour(s))   Magnesium   Result Value Ref Range    Magnesium 1.4 (L) 1.8 - 2.6 mg/dL   Phosphorus   Result Value Ref Range    Phosphorus 2.2 (L) 2.5 - 4.5 mg/dL   Basic metabolic panel   Result Value Ref Range    Sodium 140 133 - 144 mmol/L    Potassium 3.3 (L) 3.4 - 5.3 mmol/L    Chloride 108 94 - 109 mmol/L    Carbon Dioxide (CO2) 24 20 - 32 mmol/L    Anion Gap 8 3 - 14 mmol/L    Urea Nitrogen 5 (L) 7 - 30 mg/dL    Creatinine 0.56 0.52 - 1.04 mg/dL    Calcium 8.8 8.5 - 10.1 mg/dL    Glucose 89 70 - 99 mg/dL    GFR Estimate >90 >60 mL/min/1.73m2   Extra Tube    Narrative    The following orders were created for panel order Extra Tube.  Procedure                               Abnormality         Status                     ---------                               -----------         ------                      Extra Purple Top Tube[[id number]]                            Final result                 Please view results for these tests on the individual orders.   Extra Purple Top Tube   Result Value Ref Range    Hold Specimen JIC    Potassium   Result Value Ref Range    Potassium 5.3 3.4 - 5.3 mmol/L     Assessment: 56 year old HD#6 admitted with malignant small bowel obstruction resolving with conservative management     PLAN:    Dz: Stage IVB uterine carcinosarcoma s/p XL, rad hyst, BSO, debulking to [facility] (8/2021). S/p 5C Carbo/Taxol with disease progression.   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 Patient declines hospice/best supportive care and desires further chemotherapy. Will follow up with Dr. Burger for care planning within 1-2 weeks of discharge.      # Small bowel obstruction  - ADAT  - Plan to replace NGT if patient has further emesis and will discuss venting g-tube placement for definitive symptom management in the setting of ongoing obstruction   - Continue mIVF until tolerating adequate PO. Discontinue KCl supplementation in IVF if potassium remains normal or elevated today.   - Monitor I/Os  - Replete electrolytes PRN per protocols  - PRN antiemetics   - Pepcid BID    # HTN  - Continue PTA Norvasc  - PRN Hydralazine for hypertensive urgency     # Thickened right breast tissue  - Incidental finding on recent CT  - Outpatient follow up for breast ultrasound     # Houseless   - Living in shelter   -  consult for dispo planning. Will arrange transportation back to shelter on discharge.     # Sickle cell anemia   - No treatments indicated at this time     Ppx: SCDs, Lovenox  Lines/Drains: PIV  Code Status: Full Code  Dispo: Pending bowel function and PO tolerance. Anticipate discharge to shelter when meeting goals.     Yonis Nguyen MD  Gyn Onc Resident, PGY-3  02/10/2022 6:53 AM   Gyn Onc Pager [phone]  Gynecologic Oncology  Attending Attestation  I have seen the patient on rounds with the team. Small volume emesis overnight but overall net positive PO intake yesterday. Continues on low residual diet this morning. Tolerated breakfast. Continue to monitor. If she doesn't tolerate will need discussion about venting Gtube versus discharge on liquid diet.   I have discussed the patient and plan of care with the resident as documented in the note above, which I have edited as necessary.    Maxi Lindsey MD    Gynecologic Oncology

## 2022-02-10 NOTE — PROGRESS NOTES
[facility]  Gyn Onc Brief Progress Note    S: Reports evening RN prior to shift change pushed a medications through her IV that smelled strongly, similar to polyurethane. She then had burning in her throat and stomach followed by emesis x1. No longer feeling nausea. No abdominal pain.     O:  Patient Vitals for the past 24 hrs:   BP Temp Temp src Pulse Resp SpO2   02/09/22 1534 (!) 154/86 97.6  F (36.4  C) Oral 87 20 98 %   02/09/22 0609 128/69 98.4  F (36.9  C) Oral 87 20 98 %   02/08/22 2213 (!) 151/90 98.1  F (36.7  C) Oral 97 20 97 %     Gen: in NAD  Abd: soft, non-tender, moderately distended     A/P: 56 year old HD#5 with malignant SBO now with new episode of emesis following advancement of diet today.     # malignant SBO  - Return to NPO overnight  - If ongoing emesis then will plan to replace NGT, but nausea currently resolved without intervention   - Will attempt to determine what medication was given that patient thinks precipitated this. MAR shows no IV administered this evening.     Yonis Nguyen MD  Gyn Onc Resident, PGY-3  2/9/2022 9:17 PM   Gyn Onc Pager [phone]

## 2022-02-10 NOTE — PROVIDER NOTIFICATION
"Notified provider regarding pt HTN. Irina DAMIAN notified and aware. Also informed MD of pt refusing all meds. Pt saying she feels like the evening RN gave her something that felt like poison and will not take anything anymore. Pt says \"I just need to give my body a rest\". Pt also refusing IVMF.     Addendum: was able to get pt to take PO BP med this morning.   "

## 2022-02-10 NOTE — PLAN OF CARE
AOx4. UAL. HTN; notified provider per orders. Pt did not meet the parameters for PRN Hydralazine. Pt refusing her K+, Mg+ and IVMF. Educated pt on importance of getting electrolytes and IVMF pt continued to refuse; see provider notification for more details. Pt removed PIV by herself at some point during the day. Pt said she wanted to take a break from meds and IV. Provider notified regarding pt removing PIV by herself. Pt said she would be willing to try IV meds tomorrow 2/11 morning.

## 2022-02-10 NOTE — PROVIDER NOTIFICATION
"Notified provider regarding pt removal of PIV. Asked pt is we could restart IVMF as she refused them today and pt said \"we can try tomorrow, I don't want to do it today. And I took my IV out.\" I clarified with pt why she took PIV out and pt replied \"I don't want it anymore and need to give myself a break from all the meds.\"  "

## 2022-02-11 VITALS
HEART RATE: 107 BPM | TEMPERATURE: 98.1 F | RESPIRATION RATE: 18 BRPM | BODY MASS INDEX: 26.78 KG/M2 | HEIGHT: 62 IN | SYSTOLIC BLOOD PRESSURE: 140 MMHG | DIASTOLIC BLOOD PRESSURE: 79 MMHG | WEIGHT: 145.5 LBS | OXYGEN SATURATION: 98 %

## 2022-02-11 LAB
ANION GAP SERPL CALCULATED.3IONS-SCNC: 10 MMOL/L (ref 3–14)
BUN SERPL-MCNC: 7 MG/DL (ref 7–30)
CALCIUM SERPL-MCNC: 9.1 MG/DL (ref 8.5–10.1)
CHLORIDE BLD-SCNC: 102 MMOL/L (ref 94–109)
CO2 SERPL-SCNC: 24 MMOL/L (ref 20–32)
CREAT SERPL-MCNC: 0.56 MG/DL (ref 0.52–1.04)
GFR SERPL CREATININE-BSD FRML MDRD: >90 ML/MIN/1.73M2
GLUCOSE BLD-MCNC: 82 MG/DL (ref 70–99)
MAGNESIUM SERPL-MCNC: 1.4 MG/DL (ref 1.8–2.6)
PHOSPHATE SERPL-MCNC: 3.7 MG/DL (ref 2.5–4.5)
PLATELET # BLD AUTO: 129 10E3/UL (ref 150–450)
POTASSIUM BLD-SCNC: 3 MMOL/L (ref 3.4–5.3)
SODIUM SERPL-SCNC: 136 MMOL/L (ref 133–144)

## 2022-02-11 PROCEDURE — 85049 AUTOMATED PLATELET COUNT: CPT | Performed by: STUDENT IN AN ORGANIZED HEALTH CARE EDUCATION/TRAINING PROGRAM

## 2022-02-11 PROCEDURE — 99232 SBSQ HOSP IP/OBS MODERATE 35: CPT | Performed by: OBSTETRICS & GYNECOLOGY

## 2022-02-11 PROCEDURE — 36415 COLL VENOUS BLD VENIPUNCTURE: CPT | Performed by: STUDENT IN AN ORGANIZED HEALTH CARE EDUCATION/TRAINING PROGRAM

## 2022-02-11 PROCEDURE — 250N000013 HC RX MED GY IP 250 OP 250 PS 637: Performed by: OBSTETRICS & GYNECOLOGY

## 2022-02-11 PROCEDURE — 83735 ASSAY OF MAGNESIUM: CPT | Performed by: STUDENT IN AN ORGANIZED HEALTH CARE EDUCATION/TRAINING PROGRAM

## 2022-02-11 PROCEDURE — 250N000013 HC RX MED GY IP 250 OP 250 PS 637

## 2022-02-11 PROCEDURE — 84100 ASSAY OF PHOSPHORUS: CPT | Performed by: STUDENT IN AN ORGANIZED HEALTH CARE EDUCATION/TRAINING PROGRAM

## 2022-02-11 PROCEDURE — 82310 ASSAY OF CALCIUM: CPT | Performed by: STUDENT IN AN ORGANIZED HEALTH CARE EDUCATION/TRAINING PROGRAM

## 2022-02-11 RX ORDER — FAMOTIDINE 10 MG
10 TABLET ORAL 2 TIMES DAILY
Status: DISCONTINUED | OUTPATIENT
Start: 2022-02-11 | End: 2022-02-11 | Stop reason: HOSPADM

## 2022-02-11 RX ADMIN — AMLODIPINE BESYLATE 10 MG: 10 TABLET ORAL at 11:01

## 2022-02-11 RX ADMIN — ACETAMINOPHEN 650 MG: 325 TABLET, FILM COATED ORAL at 11:01

## 2022-02-11 ASSESSMENT — ACTIVITIES OF DAILY LIVING (ADL)
ADLS_ACUITY_SCORE: 4

## 2022-02-11 NOTE — PLAN OF CARE
Assumed care for pt [id number]  Hypertensive not within notifying parameters, AOVSS. Denies nausea, pain managed with prn tylenol. Up ad rodríguez, voiding spontaneously not saving. Pt sleeping between cares, needs met, safety maintained. Will continue plan of care.

## 2022-02-11 NOTE — PLAN OF CARE
Pt denies pain/nausea. Reports passing flatus and that she had x3 BM's yesterday. Tolerated breakfast. Discharge orders placed. Reviewed discharge instructions and follow-up with patient. All questions answered. Transport set to take pt down to lobby at 1300, ride was set up through .

## 2022-02-11 NOTE — PROGRESS NOTES
CLINICAL NUTRITION SERVICES  Reason for Assessment:  Per bedside RN, pt requesting to speak with dietitian.  Met with patient at bedside, pt requests education about the low fiber diet she is on.   Diet History:  Patient has no history of low fiber diet education.  Pt reports being lactose intolerant.  Nutrition Diagnosis:  Food- and nutrition-related knowledge deficit r/t no previous knowledge of low fiber diet as evidenced by patient report  Interventions:  Provided instruction on low fiber diet. Discussed each food group and foods to eat and avoid. Answered patient's questions regarding diet.   Provided handouts : Fiber-Restricted Nutrition Therapy  Goals:   Patient will verbalize at least 5 low fiber foods acceptable on diet and 5 high fiber foods to avoid.  Follow-up:    Patient to ask any further nutrition-related questions before discharge.  In addition, pt may request outpatient RD appointment.       Chayo Ramirez RD, , LD  Weekday Pager: [phone]  Weekday Units covered: 7C (all beds) and 5A (beds 5201 through 5211-2)  Weekend/Holiday RD Pager: [phone]

## 2022-02-11 NOTE — PROGRESS NOTES
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 (8/2021). S/p 5C Carbo/Taxol with disease progression.     24 hour events:   - Diet advanced to low residue, no further emesis   - IV removed, declined replacement   - Declined electrolyte repletion     Subjective: Feeling well. Tolerating low residue diet. Denies nausea, emesis. No abdominal pain. Passing flatus.     Objective:   Patient Vitals for the past 24 hrs:   BP Temp Temp src Pulse Resp SpO2   02/10/22 1927 (!) 159/82 98.4  F (36.9  C) Oral 100 17 97 %   02/10/22 1606 (!) 135/97 98  F (36.7  C) Temporal 109 16 96 %   02/10/22 1059 (!) 153/81 -- -- -- -- --   02/10/22 0856 (!) 162/94 -- -- -- -- --   02/10/22 0719 (!) 168/94 97.1  F (36.2  C) Temporal 91 16 99 %   02/09/22 2204 (!) 172/89 97.9  F (36.6  C) Temporal 104 17 94 %     EXAM  General: in NAD  CV: RR  Resp: No increased work of breathing  Abdomen: soft, nontender, mildly distended  Extremities: nontender, no edema    I/Os  (Yesterday // Since Midnight)  PO: 120 ml // NC  IV: 1200 ml // 0 ml  Urine 1x unmeasured // NC  Emesis: 0 ml // 0 ml  Stool: 4x // NC    Labs/Imaging:  Results for orders placed or performed during the hospital encounter of 02/05/22 (from the past 24 hour(s))   Basic metabolic panel   Result Value Ref Range    Sodium 137 133 - 144 mmol/L    Potassium 3.2 (L) 3.4 - 5.3 mmol/L    Chloride 106 94 - 109 mmol/L    Carbon Dioxide (CO2) 23 20 - 32 mmol/L    Anion Gap 8 3 - 14 mmol/L    Urea Nitrogen 4 (L) 7 - 30 mg/dL    Creatinine 0.50 (L) 0.52 - 1.04 mg/dL    Calcium 8.8 8.5 - 10.1 mg/dL    Glucose 90 70 - 99 mg/dL    GFR Estimate >90 >60 mL/min/1.73m2   Phosphorus   Result Value Ref Range    Phosphorus 2.8 2.5 - 4.5 mg/dL   Magnesium   Result Value Ref Range    Magnesium 1.5 (L) 1.8 - 2.6 mg/dL     Assessment: 56 year old  HD#7 admitted with malignant small bowel obstruction resolving with conservative management     PLAN:    Dz: Stage IVB uterine carcinosarcoma s/p XL, rad hyst, BSO, debulking to [facility] (8/2021). S/p 5C Carbo/Taxol with disease progression.   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 Patient declines hospice/best supportive care and desires further chemotherapy. Will follow up with Dr. Burger for care planning within 1-2 weeks of discharge.      # Malignant small bowel obstruction- resolving   - Low residue diet   - Plan to replace NGT if patient has further emesis and will discuss venting g-tube placement for definitive symptom management in the setting of ongoing obstruction   - Monitor I/Os  - PRN antiemetics   - Pepcid BID    # HTN  - Continue PTA Norvasc  - PRN Hydralazine for hypertensive urgency     # Thickened right breast tissue  - Incidental finding on recent CT  - Outpatient follow up for breast ultrasound     # Houseless   - Living in shelter   -  consult for dispo planning. Will arrange transportation back to shelter on discharge.     # Sickle cell anemia   - No treatments indicated at this time     Ppx: SCDs, Lovenox  Lines/Drains: PIV  Code Status: Full Code  Dispo: Pending bowel function and PO tolerance. Anticipate discharge to shelter today.     Yonis Nguyen MD  Gyn Onc Resident, PGY-3  02/10/2022 9:41 PM   Gyn Onc Pager [phone]    Gynecologic Oncology Attending Attestation  I have seen the patient on rounds with the team. Minimal nausea. Tolerating small amounts of PO doing well with liquids. Passing flatus. Declining multiple interventions. Can discharge today if course remains constant. Follow-up with Dr. Burger to discuss plan of care 2/25/2022. Patient has expressed desire for second opinion, which of course we support if desired. We will aid her in obtaining medical records. Previously discussed options are dependent upon  ability to maintain nutrition enterally. If able to sustain nutrition could discuss second line chemo. Likely doxorubicin or ifosphamide, but will defer to primary gyn oncologist. Other options are best supportive care +/- hospice which she is not interested in. Discussed we don't know which will lead to longer life given risks of chemo and low response rate, but if medically appropriate both would be options. She is inclined towards chemo from prior discussions.   I have discussed the patient and plan of care with the resident as documented in the note above, which I have edited as necessary.    Maxi Lindsey MD    Gynecologic Oncology

## 2022-02-11 NOTE — CONSULTS
Care Management Follow Up    Length of Stay (days): 6    Expected Discharge Date: 02/11/2022     Concerns to be Addressed: medical records, discharge transportation       Patient plan of care discussed at interdisciplinary rounds: Yes    Anticipated Discharge Disposition: shelter       Anticipated Discharge Services: NA   Anticipated Discharge DME: NA      Referrals Placed by CM/SW: NA  Private pay costs discussed: Not applicable    Additional Information:    Pt had a question for SW regarding medical records. Pt would like copies of her medical records, specifically her CT scan. SW provided her with medical record's number (Ph: [phone]) and gave her a release of information to complete. Pt acknowledges this process and is in agreement with completing the release of information.     Pt has discharge orders and will need help setting up transportation through her insurance [employer] (Ph: [phone]).     Pt has a ride scheduled through her insurance at 1:00PM. The ride is through [employer] (Ph: [phone]) and the confirmation number is [id number].     PRABHJOT Short, MOISESW   7C    Phone: [phone]  Pager: [phone]

## 2022-02-11 NOTE — PROVIDER NOTIFICATION
Gyn/Onc notified at 1015 that pt is refusing Lovenox, Pepcid, and Mg/K replacement per protocol. Per team, pt will be discharging. No new orders.